# Patient Record
Sex: MALE | Race: WHITE | Employment: OTHER | URBAN - METROPOLITAN AREA
[De-identification: names, ages, dates, MRNs, and addresses within clinical notes are randomized per-mention and may not be internally consistent; named-entity substitution may affect disease eponyms.]

---

## 2017-01-02 ENCOUNTER — TELEPHONE (OUTPATIENT)
Dept: FAMILY MEDICINE | Facility: CLINIC | Age: 82
End: 2017-01-02

## 2017-01-02 DIAGNOSIS — J31.0 CHRONIC RHINITIS: Primary | ICD-10-CM

## 2017-01-02 RX ORDER — IPRATROPIUM BROMIDE 21 UG/1
2 SPRAY, METERED NASAL EVERY 12 HOURS
Qty: 1 BOX | Refills: 5 | Status: SHIPPED | OUTPATIENT
Start: 2017-01-02 | End: 2019-01-18

## 2017-01-02 NOTE — TELEPHONE ENCOUNTER
Atrovent       Last Written Prescription Date: 11/5/2015  Last Fill Quantity: 1 box, # refills: 5    Last Office Visit with G, P or [facility] prescribing provider:  8/8/2016   Future Office Visit:       Date of Last Asthma Action Plan Letter:   There are no preventive care reminders to display for this patient.   Asthma Control Test: No flowsheet data found.    Date of Last Spirometry Test:   No results found for this or any previous visit.

## 2017-01-02 NOTE — TELEPHONE ENCOUNTER
Prescription approved per [facility] Refill Protocol.  Number listed in message is not the number for patient.  Called the number listed in contacts, informed of Rx sent.  Patient will  Rx.    Becca Greene RN  [facility]

## 2017-03-04 ENCOUNTER — TELEPHONE (OUTPATIENT)
Dept: NURSING | Facility: CLINIC | Age: 82
End: 2017-03-04

## 2017-03-04 NOTE — TELEPHONE ENCOUNTER
Call Type: Triage Call    Presenting Problem: Cannot urinate, sometimes I cannot. what do I do  if walking around doesn't help it on Saturday.  Go to UC or Er  depending upon symptoms and how long it has been.  Triage Note:  Guideline Title: Urinary Symptoms - Male  Recommended Disposition: See Provider within 4 hours  Original Inclination: Wanted to speak with a nurse  Override Disposition:  Intended Action: Follow advice given  Physician Contacted: No  No urination for 12 or more hours ?  YES  Blood in urine ? NO  Flank pain ? NO  New or worsening signs and symptoms that may indicate shock ? NO  Sudden onset of pain in testicle(s), groin, or lower abdomen AND testicle(s)  swollen or tender to touch ? NO  Unbearable abdominal/pelvic pain ? NO  Current or recent urinary tract instrumentation AND urinary tract symptoms OR no  urine flow ? NO  Urinary tract symptoms AND fever 101.5 F (38.6 C) or higher or vomiting ? NO  Any temperature elevation in an immunocompromised individual OR frail elderly with  signs of dehydration ? NO  Any other unexpected urinary symptoms following urinary tract or abdominal surgery  within timeframe specified by provider ? NO  Physician Instructions:  Care Advice: SYMPTOM / CONDITION MANAGEMENT

## 2017-04-25 NOTE — PROGRESS NOTES
[address] [address]  [phone]  Dept: [phone]    PRE-OP EVALUATION:  Today's date: 2017    Vinnie Duran (: 10/12/1933) presents for pre-operative evaluation assessment as requested by Dr. Garcia.  He requires evaluation and anesthesia risk assessment prior to undergoing surgery/procedure for treatment of BPH.  Proposed procedure: TURP    Date of Surgery/ Procedure: 17  Time of Surgery/ Procedure: 5:30 AM  Hospital/Surgical Facility: [facility]  Fax number for surgical facility: [phone]  Primary Physician: Navdeep Pabon  Type of Anesthesia Anticipated: to be determined    Patient has a Health Care Directive or Living Will:  YES Will bring in a copy    1. NO - Do you have a history of heart attack, stroke, stent, bypass or surgery on an artery in the head, neck, heart or legs?  2. NO - Do you ever have any pain or discomfort in your chest?  3. NO - Do you have a history of  Heart Failure?  4. NO - Are you troubled by shortness of breath when: walking on the level, up a slight hill or at night?  5. NO - Do you currently have a cold, bronchitis or other respiratory infection?  6. NO - Do you have a cough, shortness of breath or wheezing?  7. NO - Do you sometimes get pains in the calves of your legs when you walk?  8. NO - Do you or anyone in your family have previous history of blood clots?  9. NO - Do you or does anyone in your family have a serious bleeding problem such as prolonged bleeding following surgeries or cuts?  10. NO - Have you ever had problems with anemia or been told to take iron pills?  11. NO - Have you had any abnormal blood loss such as black, tarry or bloody stools, or abnormal vaginal bleeding?  12. NO - Have you ever had a blood transfusion?  13. NO - Have you or any of your relatives ever had problems with anesthesia?  14. NO - Do you have sleep apnea, excessive snoring or daytime  drowsiness?  15. NO - Do you have any prosthetic heart valves?  16. NO - Do you have prosthetic joints?  17. NO - Is there any chance that you may be pregnant?      HPI:                                                      Brief HPI related to upcoming procedure: 83-year-old white male has had long-standing urinary obstructive symptoms. He had a TURP done in 2008 which helped for a number of years. In more recent months he's had ongoing urinary obstructive symptoms again. He was on tamsulosin. He developed urinary retention a few weeks ago and has been on a self catheter program the last couple of weeks. He is in now for another TURP.      See problem list for active medical problems.  Problems all longstanding and stable, except as noted/documented.  See ROS for pertinent symptoms related to these conditions.                                                                                                  .    MEDICAL HISTORY:                                                      Patient Active Problem List    Diagnosis Date Noted     Thrombocytopenia (H) 11/11/2015     Priority: Medium     Chronic rhinitis 11/05/2015     Priority: Medium     Benign non-nodular prostatic hyperplasia with lower urinary tract symptoms 11/05/2015     Priority: Medium     Advanced directives, counseling/discussion 03/12/2014     Priority: Medium     Advance Care Planning:   ACP Review and Resources Provided:  Reviewed chart for advance care plan.  Vinnie Duran has no plan or code status on file however states presence of ACP document. Copy requested. Confirmed code status reflects current choices pending receipt of document/advance care plan review. Confirmed/documented designated decision maker(s). See permanent comments section of demographics in clinical tab.   Added by Odilia Mckeon on 3/12/2014             Hyperlipidemia with target LDL less than 130      Priority: Medium     Diagnosis updated by automated process.  Provider to review and confirm.       Erectile dysfunction 07/26/2013     Priority: Medium      Past Medical History:   Diagnosis Date     BPH (benign prostatic hypertrophy)      Erectile dysfunction      GERD (gastroesophageal reflux disease)      Hyperlipidemia LDL goal < 130      Melanoma (H) 2/03    superficial spreading     Past Surgical History:   Procedure Laterality Date     APPENDECTOMY       ARTHROSCOPY KNEE      right     CATARACT IOL, RT/LT  2/11    right     CATARACT IOL, RT/LT  2/13    left     HERNIA REPAIR  1989    bilat inguinal     TONSILLECTOMY       TURP  2008     Current Outpatient Prescriptions   Medication Sig Dispense Refill     amoxicillin (AMOXIL) 500 MG tablet Take 500 mg by mouth 3 times daily       ipratropium (ATROVENT) 0.03 % spray Spray 2 sprays into both nostrils every 12 hours 1 Box 5     FISH OIL        OTC products: None, except as noted above    No Known Allergies   Latex Allergy: NO    Social History   Substance Use Topics     Smoking status: Never Smoker     Smokeless tobacco: Never Used     Alcohol use No     History   Drug Use No       REVIEW OF SYSTEMS:                                                    C: NEGATIVE for fever, chills, change in weight  I: NEGATIVE for worrisome rashes, moles or lesions  E: NEGATIVE for vision changes or irritation  E/M: NEGATIVE for ear, mouth and throat problems  R: NEGATIVE for significant cough or SOB  B: NEGATIVE for masses, tenderness or discharge  CV: NEGATIVE for chest pain, palpitations or peripheral edema  GI: NEGATIVE for nausea, abdominal pain, heartburn, or change in bowel habits   male : see above  M: NEGATIVE for significant arthralgias or myalgia  N: NEGATIVE for weakness, dizziness or paresthesias  E: NEGATIVE for temperature intolerance, skin/hair changes  H: NEGATIVE for bleeding problems  P: NEGATIVE for changes in mood or affect    EXAM:                                                    /73 (BP Location: Right  "arm, Patient Position: Chair, Cuff Size: Adult Regular)  Pulse 75  Temp 97.4  F (36.3  C) (Oral)  Ht 5' 7.5\" (1.715 m)  Wt 183 lb (83 kg)  SpO2 94%  BMI 28.24 kg/m2    GENERAL APPEARANCE: healthy, alert and no distress     EYES: EOMI,  PERRL     HENT: Grossly normal     NECK: no adenopathy, no asymmetry, masses, or scars and thyroid normal to palpation     RESP: lungs clear to auscultation - no rales, rhonchi or wheezes     CV: regular rates and rhythm with some mild ectopy, normal S1 S2, no S3 or S4 and no murmur, click or rub     ABDOMEN:  soft, nontender, no HSM or masses      MS: extremities normal- no gross deformities noted     SKIN: no suspicious lesions or rashes     NEURO: Normal strength and tone, sensory exam grossly normal, mentation intact and speech normal     PSYCH: mentation appears normal. and affect normal/bright    DIAGNOSTICS:                                                    EKG: Normal Sinus Rhythm, occasional PVC, normal axis, normal intervals, no acute ST/T changes c/w ischemia, no LVH by voltage criteria  LABS:   Office Visit on 05/01/2017   Component Date Value Ref Range Status     WBC 05/01/2017 10.0  4.0 - 11.0 10e9/L Final    Verified by smear review     RBC Count 05/01/2017 4.42  4.4 - 5.9 10e12/L Final     Hemoglobin 05/01/2017 14.5  13.3 - 17.7 g/dL Final     Hematocrit 05/01/2017 42.7  40.0 - 53.0 % Final     MCV 05/01/2017 97  78 - 100 fl Final     MCH 05/01/2017 32.8  26.5 - 33.0 pg Final     MCHC 05/01/2017 34.0  31.5 - 36.5 g/dL Final     RDW 05/01/2017 13.4  10.0 - 15.0 % Final     Platelet Count 05/01/2017 142* 150 - 450 10e9/L Final     ]      Recent Labs   Lab Test  11/10/15   1014 01/02/13   HGB  14.7  16.3   PLT  126*  138*   NA  145*  142   POTASSIUM  4.4  4.7   CR  1.08  1.1        IMPRESSION:                                                    Reason for surgery/procedure: Urinary obstructive symptoms secondary to BPH  Diagnosis/reason for consult: Preoperative " history and physical     The proposed surgical procedure is considered INTERMEDIATE risk.    REVISED CARDIAC RISK INDEX  The patient has the following serious cardiovascular risks for perioperative complications such as (MI, PE, VFib and 3  AV Block):  No serious cardiac risks  INTERPRETATION: 0 risks: Class I (very low risk - 0.4% complication rate)    The patient has the following additional risks for perioperative complications:  No identified additional risks      ICD-10-CM    1. Preop general physical exam Z01.818 amoxicillin (AMOXIL) 500 MG tablet     EKG 12-lead, tracing only     CBC with platelets   2. Benign non-nodular prostatic hyperplasia with lower urinary tract symptoms N40.1    3. Thrombocytopenia (H) D69.6      Platelets are almost up into the normal range -- should not significantly affect bleeding risk.    RECOMMENDATIONS:                                                      APPROVAL GIVEN to proceed with proposed procedure, without further diagnostic evaluation       Signed Electronically by: Navdeep Pabon MD    Copy of this evaluation report is provided to requesting physician.    [place] Preop Guidelines

## 2017-05-01 ENCOUNTER — OFFICE VISIT (OUTPATIENT)
Dept: FAMILY MEDICINE | Facility: CLINIC | Age: 82
End: 2017-05-01
Payer: COMMERCIAL

## 2017-05-01 VITALS
TEMPERATURE: 97.4 F | HEART RATE: 75 BPM | OXYGEN SATURATION: 94 % | HEIGHT: 68 IN | BODY MASS INDEX: 27.74 KG/M2 | WEIGHT: 183 LBS | SYSTOLIC BLOOD PRESSURE: 131 MMHG | DIASTOLIC BLOOD PRESSURE: 73 MMHG

## 2017-05-01 DIAGNOSIS — Z01.818 PREOP GENERAL PHYSICAL EXAM: Primary | ICD-10-CM

## 2017-05-01 DIAGNOSIS — D69.6 THROMBOCYTOPENIA (H): ICD-10-CM

## 2017-05-01 DIAGNOSIS — N40.1 BENIGN NON-NODULAR PROSTATIC HYPERPLASIA WITH LOWER URINARY TRACT SYMPTOMS: ICD-10-CM

## 2017-05-01 LAB
ERYTHROCYTE [DISTWIDTH] IN BLOOD BY AUTOMATED COUNT: 13.4 % (ref 10–15)
HCT VFR BLD AUTO: 42.7 % (ref 40–53)
HGB BLD-MCNC: 14.5 G/DL (ref 13.3–17.7)
MCH RBC QN AUTO: 32.8 PG (ref 26.5–33)
MCHC RBC AUTO-ENTMCNC: 34 G/DL (ref 31.5–36.5)
MCV RBC AUTO: 97 FL (ref 78–100)
PLATELET # BLD AUTO: 142 10E9/L (ref 150–450)
RBC # BLD AUTO: 4.42 10E12/L (ref 4.4–5.9)
WBC # BLD AUTO: 10 10E9/L (ref 4–11)

## 2017-05-01 PROCEDURE — 93010 ELECTROCARDIOGRAM REPORT: CPT | Performed by: INTERNAL MEDICINE

## 2017-05-01 PROCEDURE — 99215 OFFICE O/P EST HI 40 MIN: CPT | Performed by: FAMILY MEDICINE

## 2017-05-01 PROCEDURE — 36415 COLL VENOUS BLD VENIPUNCTURE: CPT | Performed by: FAMILY MEDICINE

## 2017-05-01 PROCEDURE — 85027 COMPLETE CBC AUTOMATED: CPT | Performed by: FAMILY MEDICINE

## 2017-05-01 PROCEDURE — 93005 ELECTROCARDIOGRAM TRACING: CPT | Performed by: FAMILY MEDICINE

## 2017-05-01 RX ORDER — AMOXICILLIN 500 MG/1
500 TABLET, FILM COATED ORAL 3 TIMES DAILY
COMMUNITY
End: 2017-08-21

## 2017-05-01 NOTE — NURSING NOTE
"Chief Complaint   Patient presents with     Pre-Op Exam     DOS 5/18/17, Prostate, [facility]       Initial /73 (BP Location: Right arm, Patient Position: Chair, Cuff Size: Adult Regular)  Pulse 75  Temp 97.4  F (36.3  C) (Oral)  Ht 5' 7.5\" (1.715 m)  Wt 183 lb (83 kg)  SpO2 94%  BMI 28.24 kg/m2 Estimated body mass index is 28.24 kg/(m^2) as calculated from the following:    Height as of this encounter: 5' 7.5\" (1.715 m).    Weight as of this encounter: 183 lb (83 kg).  Medication Reconciliation: complete   Yaneli Garsia CMA       "

## 2017-05-01 NOTE — MR AVS SNAPSHOT
After Visit Summary   5/1/2017    Vinnie Duran    MRN: [id number]           Patient Information     Date Of Birth          10/12/1933        Visit Information        Provider Department      5/1/2017 3:00 PM Navdeep Pabon MD [facility]        Today's Diagnoses     Preop general physical exam    -  1    Benign non-nodular prostatic hyperplasia with lower urinary tract symptoms          Care Instructions      Before Your Surgery      Call your surgeon if there is any change in your health. This includes signs of a cold or flu (such as a sore throat, runny nose, cough, rash or fever).    Do not smoke, drink alcohol or take over the counter medicine (unless your surgeon or primary care doctor tells you to) for the 24 hours before and after surgery.    If you take prescribed drugs: Follow your doctor s orders about which medicines to take and which to stop until after surgery.    Eating and drinking prior to surgery: follow the instructions from your surgeon    Take a shower or bath the night before surgery. Use the soap your surgeon gave you to gently clean your skin. If you do not have soap from your surgeon, use your regular soap. Do not shave or scrub the surgery site.  Wear clean pajamas and have clean sheets on your bed.         Follow-ups after your visit        Who to contact     If you have questions or need follow up information about today's clinic visit or your schedule please contact [facility] directly at [phone].  Normal or non-critical lab and imaging results will be communicated to you by MyChart, letter or phone within 4 business days after the clinic has received the results. If you do not hear from us within 7 days, please contact the clinic through MyChart or phone. If you have a critical or abnormal lab result, we will notify you by phone as soon as possible.  Submit refill requests through [employer] or call your pharmacy and they  "will forward the refill request to us. Please allow 3 business days for your refill to be completed.          Additional Information About Your Visit        [employer] Information     [employer] lets you send messages to your doctor, view your test results, renew your prescriptions, schedule appointments and more. To sign up, go to [email or web address] . Click on \"Log in\" on the left side of the screen, which will take you to the Welcome page. Then click on \"Sign up Now\" on the right side of the page.     You will be asked to enter the access code listed below, as well as some personal information. Please follow the directions to create your username and password.     Your access code is: [email or web address]  Expires: 2017  8:36 AM     Your access code will  in 90 days. If you need help or a new code, please call your [place] clinic or [phone].        Care EveryWhere ID     This is your [facility] EveryWhere ID. This could be used by other organizations to access your [place] medical records  [id number]        Your Vitals Were     Pulse Temperature Height Pulse Oximetry BMI (Body Mass Index)       75 97.4  F (36.3  C) (Oral) 5' 7.5\" (1.715 m) 94% 28.24 kg/m2        Blood Pressure from Last 3 Encounters:   17 131/73   16 117/73   16 125/74    Weight from Last 3 Encounters:   17 183 lb (83 kg)   16 191 lb (86.6 kg)   16 200 lb (90.7 kg)              We Performed the Following     CBC with platelets     EKG 12-lead, tracing only        Primary Care Provider Office Phone # Fax #    Navdeep Pabon [phone]       [facility] [address]  [facility] [address]        Thank you!     Thank you for choosing [facility]  for your care. Our goal is always to provide you with excellent care. Hearing back from our patients is one way we can continue to improve our services. Please take a few minutes to complete the written " survey that you may receive in the mail after your visit with us. Thank you!             Your Updated Medication List - Protect others around you: Learn how to safely use, store and throw away your medicines at www.disposemymeds.org.          This list is accurate as of: 5/1/17  4:07 PM.  Always use your most recent med list.                   Brand Name Dispense Instructions for use    amoxicillin 500 MG tablet    AMOXIL     Take 500 mg by mouth 3 times daily       FISH OIL          ipratropium 0.03 % spray    ATROVENT    1 Box    Spray 2 sprays into both nostrils every 12 hours

## 2017-08-17 NOTE — PROGRESS NOTES
[address] [address]  [phone]  Dept: [phone]    PRE-OP EVALUATION:  Today's date: 2017    Vinnie Duran (: 10/12/1933) presents for pre-operative evaluation assessment as requested by Dr. Garcia.  He requires evaluation and anesthesia risk assessment prior to undergoing surgery/procedure for treatment of an 8 mm left-sided kidney stone .  Proposed procedure: Stone removal    Date of Surgery/ Procedure: 17  Time of Surgery/ Procedure: 9:45 AM  Hospital/Surgical Facility: [facility]  Fax number for surgical facility: [phone]  Primary Physician: Navdeep Pabon  Type of Anesthesia Anticipated: to be determined    Patient has a Health Care Directive or Living Will:  YES will bring in a copy    1. NO - Do you have a history of heart attack, stroke, stent, bypass or surgery on an artery in the head, neck, heart or legs?  2. NO - Do you ever have any pain or discomfort in your chest?  3. NO - Do you have a history of  Heart Failure?  4. NO - Are you troubled by shortness of breath when: walking on the level, up a slight hill or at night?  5. NO - Do you currently have a cold, bronchitis or other respiratory infection?  6. NO - Do you have a cough, shortness of breath or wheezing?  7. NO - Do you sometimes get pains in the calves of your legs when you walk?  8. NO - Do you or anyone in your family have previous history of blood clots?  9. NO - Do you or does anyone in your family have a serious bleeding problem such as prolonged bleeding following surgeries or cuts?  10. NO - Have you ever had problems with anemia or been told to take iron pills?  11. NO - Have you had any abnormal blood loss such as black, tarry or bloody stools, or abnormal vaginal bleeding?  12. NO - Have you ever had a blood transfusion?  13. NO - Have you or any of your relatives ever had problems with anesthesia?  14. NO - Do you have  sleep apnea, excessive snoring or daytime drowsiness?  15. NO - Do you have any prosthetic heart valves?  16. NO - Do you have prosthetic joints?  17. NO - Is there any chance that you may be pregnant?        HPI:                                                      Brief HPI related to upcoming procedure: 83-year-old white male who had a TURP in May of this year. Last week on Wednesday, August 16 he developed left-sided flank and abdominal pain with persistent vomiting. He went to the ER where he was found to have mild left-sided hydronephrosis with a left renal pelvic stone measuring 8 mm. There was also an additional 1 mm left intrarenal stone noted. He was also found to have a urinary tract infection. He was started on a 10 day course of Cipro, which he is still taking.  He is coming in now for procedural intervention for the left 8 mm kidney stone.      See problem list for active medical problems.  Problems all longstanding and stable, except as noted/documented.  See ROS for pertinent symptoms related to these conditions.                                                                                                  .    MEDICAL HISTORY:                                                    Patient Active Problem List    Diagnosis Date Noted     Thrombocytopenia (H) 11/11/2015     Priority: Medium     Chronic rhinitis 11/05/2015     Priority: Medium     Benign non-nodular prostatic hyperplasia with lower urinary tract symptoms 11/05/2015     Priority: Medium     Advanced directives, counseling/discussion 03/12/2014     Priority: Medium     Advance Care Planning:   ACP Review and Resources Provided:  Reviewed chart for advance care plan.  [place] JACK Duran has no plan or code status on file however states presence of ACP document. Copy requested. Confirmed code status reflects current choices pending receipt of document/advance care plan review. Confirmed/documented designated decision maker(s). See permanent  comments section of demographics in clinical tab.   Added by Odilia Mckeon on 3/12/2014             Hyperlipidemia with target LDL less than 130      Priority: Medium     Diagnosis updated by automated process. Provider to review and confirm.       Erectile dysfunction 07/26/2013     Priority: Medium      Past Medical History:   Diagnosis Date     BPH (benign prostatic hypertrophy)      Erectile dysfunction      GERD (gastroesophageal reflux disease)      Hyperlipidemia LDL goal < 130      Melanoma (H) 2/03    superficial spreading     Past Surgical History:   Procedure Laterality Date     APPENDECTOMY       ARTHROSCOPY KNEE      right     CATARACT IOL, RT/LT  2/11    right     CATARACT IOL, RT/LT  2/13    left     HERNIA REPAIR  1989    bilat inguinal     TONSILLECTOMY       TURP  2008 and 5/18/17     Current Outpatient Prescriptions   Medication Sig Dispense Refill     ciprofloxacin (CIPRO) 500 MG tablet Take 500 mg by mouth       HYDROcodone-acetaminophen (NORCO) 5-325 MG per tablet Take 1-2 tablets by mouth       ondansetron (ZOFRAN-ODT) 4 MG ODT tab Place 4 mg under the tongue       ipratropium (ATROVENT) 0.03 % spray Spray 2 sprays into both nostrils every 12 hours 1 Box 5     FISH OIL        OTC products: None, except as noted above    No Known Allergies   Latex Allergy: NO    Social History   Substance Use Topics     Smoking status: Never Smoker     Smokeless tobacco: Never Used     Alcohol use No     History   Drug Use No       REVIEW OF SYSTEMS:                                                    C: NEGATIVE for fever, chills, change in weight  I: NEGATIVE for worrisome rashes, moles or lesions  E: NEGATIVE for vision changes or irritation  E/M: NEGATIVE for ear, mouth and throat problems  R: NEGATIVE for significant cough or SOB  B: NEGATIVE for masses, tenderness or discharge  CV: NEGATIVE for chest pain, palpitations or peripheral edema  GI: NEGATIVE for nausea, abdominal pain, heartburn, or change  in bowel habits   male : He has still had up to 300 cc of residual urine after his TURP, so he is still on a self catheter program  M: NEGATIVE for significant arthralgias or myalgia  N: NEGATIVE for weakness, dizziness or paresthesias  E: NEGATIVE for temperature intolerance, skin/hair changes  H: NEGATIVE for bleeding problems  P: NEGATIVE for changes in mood or affect    EXAM:                                                    /73 (BP Location: Right arm, Patient Position: Chair, Cuff Size: Adult Regular)  Pulse 70  Temp 98.6  F (37  C) (Oral)  Wt 188 lb (85.3 kg)  SpO2 94%  BMI 29.01 kg/m2    GENERAL APPEARANCE: healthy, alert and no distress     EYES: EOMI,  PERRL     HENT: ear canals and TM's normal and nose and mouth without ulcers or lesions     NECK: no adenopathy, no asymmetry, masses, or scars and thyroid normal to palpation     RESP: lungs clear to auscultation - no rales, rhonchi or wheezes     CV: regular rates and rhythm, normal S1 S2, no S3 or S4 and no murmur, click or rub     ABDOMEN:  soft, nontender, no HSM or masses      MS: extremities normal- no gross deformities noted, no evidence of inflammation in joints     SKIN: no suspicious lesions or rashes     NEURO: Normal strength and tone, sensory exam grossly normal, mentation intact and speech normal     PSYCH: mentation appears normal and affect normal/bright    DIAGNOSTICS:                                                    EKG: from 5/1/17 -- Normal Sinus Rhythm, normal axis, normal intervals, no acute ST/T changes c/w ischemia, no LVH by voltage criteria, occasional PAC  LABS:   Office Visit on 08/21/2017   Component Date Value Ref Range Status     Color Urine 08/21/2017 Yellow   Final     Appearance Urine 08/21/2017 Clear   Final     Glucose Urine 08/21/2017 Negative  NEG^Negative mg/dL Final     Bilirubin Urine 08/21/2017 Negative  NEG^Negative Final     Ketones Urine 08/21/2017 Negative  NEG^Negative mg/dL Final     Specific  Gravity Urine 08/21/2017 <=1.005  1.003 - 1.035 Final     Blood Urine 08/21/2017 Small* NEG^Negative Final     pH Urine 08/21/2017 5.5  5.0 - 7.0 pH Final     Protein Albumin Urine 08/21/2017 Negative  NEG^Negative mg/dL Final     Urobilinogen Urine 08/21/2017 0.2  0.2 - 1.0 EU/dL Final     Nitrite Urine 08/21/2017 Negative  NEG^Negative Final     Leukocyte Esterase Urine 08/21/2017 Moderate* NEG^Negative Final     Source 08/21/2017 Midstream Urine   Final     WBC Urine 08/21/2017 * OTO2^O - 2 /HPF Final     RBC Urine 08/21/2017 10-25* OTO2^O - 2 /HPF Final     Squamous Epithelial /LPF Urine 08/21/2017 Few  FEW^Few /LPF Final     Bacteria Urine 08/21/2017 Moderate* NEG^Negative /HPF Final     ]    Recent Labs   Lab Test  05/01/17   1619  11/10/15   1014 01/02/13   HGB  14.5  14.7  16.3   PLT  142*  126*  138*   NA   --   145*  142   POTASSIUM   --   4.4  4.7   CR   --   1.08  1.1      His urine culture from August 16 from the ER grew out greater than 100,000 colonies of enterococcus, sensitive to levofloxacin (so presumably also to ciprofloxacin).    IMPRESSION:                                                    Reason for surgery/procedure: Left-sided nephrolithiasis  Diagnosis/reason for consult: Preoperative history and physical     The proposed surgical procedure is considered LOW risk.    REVISED CARDIAC RISK INDEX  The patient has the following serious cardiovascular risks for perioperative complications such as (MI, PE, VFib and 3  AV Block):  No serious cardiac risks  INTERPRETATION: 0 risks: Class I (very low risk - 0.4% complication rate)    The patient has the following additional risks for perioperative complications:  No identified additional risks      ICD-10-CM    1. Preop general physical exam Z01.818 Urine Microscopic   2. Calculus of kidney N20.0 HYDROcodone-acetaminophen (NORCO) 5-325 MG per tablet     UA reflex to Microscopic and Culture   3. Nonspecific finding on examination of urine  R82.90 Urine Culture Aerobic Bacterial       RECOMMENDATIONS:                                                      His urinalysis looks improved from last week and he was advised to finish out the Cipro.    --Patient is to take all scheduled medications on the day of surgery EXCEPT for modifications listed below.    APPROVAL GIVEN to proceed with proposed procedure, without further diagnostic evaluation       Signed Electronically by: Navdeep Pabon MD    Copy of this evaluation report is provided to requesting physician.    [place] Preop Guidelines

## 2017-08-21 ENCOUNTER — OFFICE VISIT (OUTPATIENT)
Dept: FAMILY MEDICINE | Facility: CLINIC | Age: 82
End: 2017-08-21
Payer: COMMERCIAL

## 2017-08-21 VITALS
TEMPERATURE: 98.6 F | DIASTOLIC BLOOD PRESSURE: 73 MMHG | SYSTOLIC BLOOD PRESSURE: 135 MMHG | OXYGEN SATURATION: 94 % | WEIGHT: 188 LBS | HEART RATE: 70 BPM | BODY MASS INDEX: 29.01 KG/M2

## 2017-08-21 DIAGNOSIS — Z01.818 PREOP GENERAL PHYSICAL EXAM: Primary | ICD-10-CM

## 2017-08-21 DIAGNOSIS — N20.0 CALCULUS OF KIDNEY: ICD-10-CM

## 2017-08-21 DIAGNOSIS — R82.90 NONSPECIFIC FINDING ON EXAMINATION OF URINE: ICD-10-CM

## 2017-08-21 LAB
ALBUMIN UR-MCNC: NEGATIVE MG/DL
APPEARANCE UR: CLEAR
BACTERIA #/AREA URNS HPF: ABNORMAL /HPF
BILIRUB UR QL STRIP: NEGATIVE
COLOR UR AUTO: YELLOW
GLUCOSE UR STRIP-MCNC: NEGATIVE MG/DL
HGB UR QL STRIP: ABNORMAL
KETONES UR STRIP-MCNC: NEGATIVE MG/DL
LEUKOCYTE ESTERASE UR QL STRIP: ABNORMAL
NITRATE UR QL: NEGATIVE
NON-SQ EPI CELLS #/AREA URNS LPF: ABNORMAL /LPF
PH UR STRIP: 5.5 PH (ref 5–7)
RBC #/AREA URNS AUTO: ABNORMAL /HPF
SOURCE: ABNORMAL
SP GR UR STRIP: <=1.005 (ref 1–1.03)
UROBILINOGEN UR STRIP-ACNC: 0.2 EU/DL (ref 0.2–1)
WBC #/AREA URNS AUTO: ABNORMAL /HPF

## 2017-08-21 PROCEDURE — 87086 URINE CULTURE/COLONY COUNT: CPT | Performed by: FAMILY MEDICINE

## 2017-08-21 PROCEDURE — 99215 OFFICE O/P EST HI 40 MIN: CPT | Performed by: FAMILY MEDICINE

## 2017-08-21 PROCEDURE — 81001 URINALYSIS AUTO W/SCOPE: CPT | Performed by: FAMILY MEDICINE

## 2017-08-21 RX ORDER — ONDANSETRON 4 MG/1
4 TABLET, ORALLY DISINTEGRATING ORAL
COMMUNITY
Start: 2017-08-16 | End: 2018-07-19

## 2017-08-21 RX ORDER — CIPROFLOXACIN 500 MG/1
500 TABLET, FILM COATED ORAL
COMMUNITY
Start: 2017-08-16 | End: 2017-08-26

## 2017-08-21 RX ORDER — HYDROCODONE BITARTRATE AND ACETAMINOPHEN 5; 325 MG/1; MG/1
1-2 TABLET ORAL
COMMUNITY
Start: 2017-08-16 | End: 2017-08-21

## 2017-08-21 RX ORDER — HYDROCODONE BITARTRATE AND ACETAMINOPHEN 5; 325 MG/1; MG/1
1-2 TABLET ORAL EVERY 6 HOURS PRN
Qty: 10 TABLET | Refills: 0 | Status: SHIPPED | OUTPATIENT
Start: 2017-08-21 | End: 2018-07-19

## 2017-08-21 NOTE — NURSING NOTE
"Chief Complaint   Patient presents with     Pre-Op Exam     DOS 8/23, kidney stones       Initial /73 (BP Location: Right arm, Patient Position: Chair, Cuff Size: Adult Regular)  Pulse 70  Temp 98.6  F (37  C) (Oral)  Wt 188 lb (85.3 kg)  SpO2 94%  BMI 29.01 kg/m2 Estimated body mass index is 29.01 kg/(m^2) as calculated from the following:    Height as of 5/1/17: 5' 7.5\" (1.715 m).    Weight as of this encounter: 188 lb (85.3 kg).  Medication Reconciliation: complete   Yaneli Garsia CMA       "

## 2017-08-21 NOTE — MR AVS SNAPSHOT
After Visit Summary   8/21/2017    Vinnie Duran    MRN: [id number]           Patient Information     Date Of Birth          10/12/1933        Visit Information        Provider Department      8/21/2017 8:40 AM Navdeep Pabon MD [facility]        Today's Diagnoses     Preop general physical exam    -  1    Calculus of kidney          Care Instructions      Before Your Surgery      Call your surgeon if there is any change in your health. This includes signs of a cold or flu (such as a sore throat, runny nose, cough, rash or fever).    Do not smoke, drink alcohol or take over the counter medicine (unless your surgeon or primary care doctor tells you to) for the 24 hours before and after surgery.    If you take prescribed drugs: Follow your doctor s orders about which medicines to take and which to stop until after surgery.    Eating and drinking prior to surgery: follow the instructions from your surgeon    Take a shower or bath the night before surgery. Use the soap your surgeon gave you to gently clean your skin. If you do not have soap from your surgeon, use your regular soap. Do not shave or scrub the surgery site.  Wear clean pajamas and have clean sheets on your bed.           Follow-ups after your visit        Follow-up notes from your care team     Return if symptoms worsen or fail to improve.      Who to contact     If you have questions or need follow up information about today's clinic visit or your schedule please contact [facility] directly at [phone].  Normal or non-critical lab and imaging results will be communicated to you by MyChart, letter or phone within 4 business days after the clinic has received the results. If you do not hear from us within 7 days, please contact the clinic through MyChart or phone. If you have a critical or abnormal lab result, we will notify you by phone as soon as possible.  Submit refill requests through  "Juliet or call your pharmacy and they will forward the refill request to us. Please allow 3 business days for your refill to be completed.          Additional Information About Your Visit        MyChart Information     [employer] Maxhart lets you send messages to your doctor, view your test results, renew your prescriptions, schedule appointments and more. To sign up, go to [email or web address] . Click on \"Log in\" on the left side of the screen, which will take you to the Welcome page. Then click on \"Sign up Now\" on the right side of the page.     You will be asked to enter the access code listed below, as well as some personal information. Please follow the directions to create your username and password.     Your access code is: [email or web address]  Expires: 2017  9:25 AM     Your access code will  in 90 days. If you need help or a new code, please call your [place] clinic or [phone].        Care EveryWhere ID     This is your Care EveryWhere ID. This could be used by other organizations to access your [place] medical records  [id number]        Your Vitals Were     Pulse Temperature Pulse Oximetry BMI (Body Mass Index)          70 98.6  F (37  C) (Oral) 94% 29.01 kg/m2         Blood Pressure from Last 3 Encounters:   17 135/73   17 131/73   16 117/73    Weight from Last 3 Encounters:   17 188 lb (85.3 kg)   17 183 lb (83 kg)   16 191 lb (86.6 kg)              We Performed the Following     UA reflex to Microscopic and Culture          Today's Medication Changes          These changes are accurate as of: 17  9:25 AM.  If you have any questions, ask your nurse or doctor.               These medicines have changed or have updated prescriptions.        Dose/Directions    HYDROcodone-acetaminophen 5-325 MG per tablet   Commonly known as:  NORCO   This may have changed:    - when to take this  - reasons to take this   Used for:  Calculus of kidney   Changed by:  Cm" Navdeep VARGHESE MD        Dose:  1-2 tablet   Take 1-2 tablets by mouth every 6 hours as needed for moderate to severe pain   Quantity:  10 tablet   Refills:  0            Where to get your medicines      Some of these will need a paper prescription and others can be bought over the counter.  Ask your nurse if you have questions.     Bring a paper prescription for each of these medications     HYDROcodone-acetaminophen 5-325 MG per tablet                Primary Care Provider Office Phone # Fax #    Navdeep Pabon [phone]       [address] [facility] [address]        Equal Access to Services     [facility]: Hadii aad ku hadasho Soomaali, waaxda luqadaha, qaybta kaalmada adeegyada, chel aiken . So [facility] [phone].    ATENCIÓN: Si habla español, tiene a ferro disposición servicios gratuitos de asistencia lingüística. Llame al [phone].    We comply with applicable federal civil rights laws and [place] laws. We do not discriminate on the basis of race, color, national origin, age, disability sex, sexual orientation or gender identity.            Thank you!     Thank you for choosing [facility]  for your care. Our goal is always to provide you with excellent care. Hearing back from our patients is one way we can continue to improve our services. Please take a few minutes to complete the written survey that you may receive in the mail after your visit with us. Thank you!             Your Updated Medication List - Protect others around you: Learn how to safely use, store and throw away your medicines at www.disposemymeds.org.          This list is accurate as of: 8/21/17  9:25 AM.  Always use your most recent med list.                   Brand Name Dispense Instructions for use Diagnosis    ciprofloxacin 500 MG tablet    CIPRO     Take 500 mg by mouth        FISH OIL           HYDROcodone-acetaminophen 5-325 MG per tablet    NORCO     10 tablet    Take 1-2 tablets by mouth every 6 hours as needed for moderate to severe pain    Calculus of kidney       ipratropium 0.03 % spray    ATROVENT    1 Box    Spray 2 sprays into both nostrils every 12 hours    Chronic rhinitis       ondansetron 4 MG ODT tab    ZOFRAN-ODT     Place 4 mg under the tongue

## 2017-08-22 ENCOUNTER — TELEPHONE (OUTPATIENT)
Dept: FAMILY MEDICINE | Facility: CLINIC | Age: 82
End: 2017-08-22

## 2017-08-22 LAB
BACTERIA SPEC CULT: NO GROWTH
SPECIMEN SOURCE: NORMAL

## 2017-08-22 NOTE — TELEPHONE ENCOUNTER
Reason for Call:  Other Pre Op notes    Detailed comments:  Patient is scheduled for surgery tomorrow 8/23/17 at 5:30am and Alda needs the Pre Op questionnaire and EKG if that was done to [phone].      Phone Number Patient can be reached at: Other phone number:  [phone]     Best Time: anytime    Can we leave a detailed message on this number? YES    Call taken on 8/22/2017 at 1:06 PM by Zehra Nixon

## 2017-08-23 ENCOUNTER — TRANSFERRED RECORDS (OUTPATIENT)
Dept: HEALTH INFORMATION MANAGEMENT | Facility: CLINIC | Age: 82
End: 2017-08-23

## 2017-10-09 ENCOUNTER — ALLIED HEALTH/NURSE VISIT (OUTPATIENT)
Dept: NURSING | Facility: CLINIC | Age: 82
End: 2017-10-09
Payer: COMMERCIAL

## 2017-10-09 DIAGNOSIS — Z23 NEED FOR PROPHYLACTIC VACCINATION AND INOCULATION AGAINST INFLUENZA: Primary | ICD-10-CM

## 2017-10-09 PROCEDURE — 90662 IIV NO PRSV INCREASED AG IM: CPT

## 2017-10-09 PROCEDURE — G0008 ADMIN INFLUENZA VIRUS VAC: HCPCS

## 2017-10-09 PROCEDURE — 99207 ZZC NO CHARGE NURSE ONLY: CPT

## 2017-10-09 NOTE — MR AVS SNAPSHOT
"              After Visit Summary   10/9/2017    Vinnie Duran    MRN: [id number]           Patient Information     Date Of Birth          10/12/1933        Visit Information        Provider Department      10/9/2017 10:50 AM CARE COORDINATOR [facility]        Today's Diagnoses     Need for prophylactic vaccination and inoculation against influenza    -  1       Follow-ups after your visit        Who to contact     If you have questions or need follow up information about today's clinic visit or your schedule please contact [facility] directly at [phone].  Normal or non-critical lab and imaging results will be communicated to you by EnzySurgehart, letter or phone within 4 business days after the clinic has received the results. If you do not hear from us within 7 days, please contact the clinic through EnzySurgehart or phone. If you have a critical or abnormal lab result, we will notify you by phone as soon as possible.  Submit refill requests through Pitchbrite or call your pharmacy and they will forward the refill request to us. Please allow 3 business days for your refill to be completed.          Additional Information About Your Visit        MyChart Information     Pitchbrite lets you send messages to your doctor, view your test results, renew your prescriptions, schedule appointments and more. To sign up, go to [email or web address] . Click on \"Log in\" on the left side of the screen, which will take you to the Welcome page. Then click on \"Sign up Now\" on the right side of the page.     You will be asked to enter the access code listed below, as well as some personal information. Please follow the directions to create your username and password.     Your access code is: [email or web address]  Expires: 2017  9:25 AM     Your access code will  in 90 days. If you need help or a new code, please call your [facility] or [phone].        Care EveryWhere ID     This " is your Care EveryWhere ID. This could be used by other organizations to access your [place] medical records  [id number]         Blood Pressure from Last 3 Encounters:   08/21/17 135/73   05/01/17 131/73   08/08/16 117/73    Weight from Last 3 Encounters:   08/21/17 188 lb (85.3 kg)   05/01/17 183 lb (83 kg)   08/08/16 191 lb (86.6 kg)              We Performed the Following     ADMIN INFLUENZA (For MEDICARE Patients ONLY) []     FLU VACCINE, INCREASED ANTIGEN, PRESV FREE, AGE 65+ [[address]]        Primary Care Provider Office Phone # Fax #    Navdeep Pabon [phone]       [address] [facility] [address]        Equal Access to Services     [facility] : Hadii ilda nieto hadasho Soomaali, waaxda luqadaha, qaybta kaalmada adeegyada, chel aiken . So [facility] [phone].    ATENCIÓN: Si habla español, tiene a ferro disposición servicios gratuitos de asistencia lingüística. Llame al [phone].    We comply with applicable federal civil rights laws and [place] laws. We do not discriminate on the basis of race, color, national origin, age, disability, sex, sexual orientation, or gender identity.            Thank you!     Thank you for choosing [facility]  for your care. Our goal is always to provide you with excellent care. Hearing back from our patients is one way we can continue to improve our services. Please take a few minutes to complete the written survey that you may receive in the mail after your visit with us. Thank you!             Your Updated Medication List - Protect others around you: Learn how to safely use, store and throw away your medicines at www.disposemymeds.org.          This list is accurate as of: 10/9/17 11:16 AM.  Always use your most recent med list.                   Brand Name Dispense Instructions for use Diagnosis    FISH OIL           HYDROcodone-acetaminophen 5-325 MG per tablet    NORCO    10 tablet     Take 1-2 tablets by mouth every 6 hours as needed for moderate to severe pain    Calculus of kidney       ipratropium 0.03 % spray    ATROVENT    1 Box    Spray 2 sprays into both nostrils every 12 hours    Chronic rhinitis       ondansetron 4 MG ODT tab    ZOFRAN-ODT     Place 4 mg under the tongue

## 2017-10-09 NOTE — PROGRESS NOTES
Injectable Influenza Immunization Documentation    1.  Is the person to be vaccinated sick today?   No    2. Does the person to be vaccinated have an allergy to a component   of the vaccine?   No    3. Has the person to be vaccinated ever had a serious reaction   to influenza vaccine in the past?   No    4. Has the person to be vaccinated ever had Guillain-Barré syndrome?   No    Form completed by Yaneli Garsia CMA

## 2018-05-16 ENCOUNTER — OFFICE VISIT (OUTPATIENT)
Dept: FAMILY MEDICINE | Facility: CLINIC | Age: 83
End: 2018-05-16
Payer: COMMERCIAL

## 2018-05-16 VITALS
OXYGEN SATURATION: 96 % | HEART RATE: 76 BPM | TEMPERATURE: 96.7 F | WEIGHT: 191.6 LBS | SYSTOLIC BLOOD PRESSURE: 137 MMHG | DIASTOLIC BLOOD PRESSURE: 84 MMHG | BODY MASS INDEX: 29.57 KG/M2

## 2018-05-16 DIAGNOSIS — J98.01 ACUTE BRONCHOSPASM: Primary | ICD-10-CM

## 2018-05-16 DIAGNOSIS — N52.9 ED (ERECTILE DYSFUNCTION): Primary | ICD-10-CM

## 2018-05-16 PROCEDURE — 99213 OFFICE O/P EST LOW 20 MIN: CPT | Performed by: PHYSICIAN ASSISTANT

## 2018-05-16 PROCEDURE — 84270 ASSAY OF SEX HORMONE GLOBUL: CPT

## 2018-05-16 PROCEDURE — 84403 ASSAY OF TOTAL TESTOSTERONE: CPT

## 2018-05-16 PROCEDURE — 36415 COLL VENOUS BLD VENIPUNCTURE: CPT

## 2018-05-16 RX ORDER — ALBUTEROL SULFATE 90 UG/1
1-2 AEROSOL, METERED RESPIRATORY (INHALATION) EVERY 6 HOURS PRN
Qty: 1 INHALER | Refills: 0 | Status: SHIPPED | OUTPATIENT
Start: 2018-05-16 | End: 2019-08-20

## 2018-05-16 RX ORDER — CODEINE PHOSPHATE AND GUAIFENESIN 10; 100 MG/5ML; MG/5ML
1-2 SOLUTION ORAL EVERY 6 HOURS PRN
Qty: 180 ML | Refills: 0 | Status: SHIPPED | OUTPATIENT
Start: 2018-05-16 | End: 2018-07-19

## 2018-05-16 NOTE — PROGRESS NOTES
SUBJECTIVE:  Vinnie Duran is a 84 year old male who presents with the following concerns;              Symptoms: cc Present Absent Comment   Fever/Chills   x    Fatigue  x     Muscle Aches   x    Eye Irritation   x    Sneezing   x    Nasal David/Drg  x     Sinus Pressure/Pain   x    Loss of smell   x    Dental pain   x    Sore Throat   x    Swollen Glands   x    Ear Pain/Fullness   x    Cough  x  Deep coughing   Wheeze  x     Chest Pain   x    Shortness of breath   x    Rash   x    Other   x      Symptom duration:  x3wks   Sympom severity:  better   Treatments tried:  OTC tylenol and sinus medication   Contacts:  none     3 week history of cough, sore throat, sinus drainage, and fatigue. No temperature recorded.  Started out as a cold/possible seasonal allergies, but his cough has hung around with clear/yellow sputum longer than he expected.   Taking OTC cough suppression with relief.   Bowel and urinary patterns are normal for patient.   No pain today    Medications updated and reviewed.  Past, family and surgical history is updated and reviewed in the record.    ROS:  Other than noted above, general, HEENT, respiratory, cardiac and gastrointestinal systems are negative.    OBJECTIVE:  /84  Pulse 76  Temp 96.7  F (35.9  C) (Tympanic)  Wt 191 lb 9.6 oz (86.9 kg)  SpO2 96%  BMI 29.57 kg/m2  GENERAL: Pleasant and interactive. No acute distress.  HEENT: Mild injection of conjunctiva. TMs clear. Oropharynx moist and clear.   NECK: supple and free of adenopathy or masses, the thyroid is normal without enlargement or nodules.  CHEST:  clear, no wheezing or rales. Normal symmetric air entry throughout both lung fields. No chest wall deformities or tenderness.  HEART:  S1 and S2 normal, no murmurs, clicks, gallops or rubs. Regular rate and rhythm.  SKIN:  Only benign skin findings. No unusual rashes or suspicious skin lesions noted. Nails appear normal.    Assessment:    Encounter Diagnosis   Name Primary?      Acute bronchospasm Yes     Plan:   Orders Placed This Encounter     guaiFENesin-codeine (ROBITUSSIN AC) 100-10 MG/5ML SOLN solution     albuterol (PROAIR HFA/PROVENTIL HFA/VENTOLIN HFA) 108 (90 Base) MCG/ACT Inhaler         Supportive therapy also discussed. Follow up if symptoms fail to improve or worsen.      The patient was in agreement with the plan today and had no questions or concerns prior to leaving the clinic.     Physician Attestation   I, Noni Piper, was present with the medical student who participated in the service and in the documentation of the note.  I have verified the history and personally performed the physical exam and medical decision making.  I agree with the assessment and plan of care as documented in the note.      Items personally reviewed: vitals     Noni Piper PA-C

## 2018-05-16 NOTE — PATIENT INSTRUCTIONS
Use your inhaler 2-3 times per day or when you're wheezing or coughing.  Blow all of your air out, bring the inhaler to your mouth, press the button, and breath in. Hold your breath 6-8 seconds then slowing exhale.     Increase your water intake in order to keep the secretions/mucous in your upper respiratory tract thin. Get plenty of rest and wash your hands well. Follow up if symptoms fail to improve or worsen.

## 2018-05-16 NOTE — MR AVS SNAPSHOT
"              After Visit Summary   5/16/2018    Vinnie Duran    MRN: [id number]           Patient Information     Date Of Birth          10/12/1933        Visit Information        Provider Department      5/16/2018 10:20 AM Noni Piper PA-C [facility]        Today's Diagnoses     Acute bronchospasm    -  1      Care Instructions    Use your inhaler 2-3 times per day or when you're wheezing or coughing.  Blow all of your air out, bring the inhaler to your mouth, press the button, and breath in. Hold your breath 6-8 seconds then slowing exhale.     Increase your water intake in order to keep the secretions/mucous in your upper respiratory tract thin. Get plenty of rest and wash your hands well. Follow up if symptoms fail to improve or worsen.            Follow-ups after your visit        Who to contact     Normal or non-critical lab and imaging results will be communicated to you by RetSKUhart, letter or phone within 4 business days after the clinic has received the results. If you do not hear from us within 7 days, please contact the clinic through RetSKUhart or phone. If you have a critical or abnormal lab result, we will notify you by phone as soon as possible.  Submit refill requests through Ping Communication or call your pharmacy and they will forward the refill request to us. Please allow 3 business days for your refill to be completed.          If you need to speak with a  for additional information , please call: [phone]             Additional Information About Your Visit        Ping Communication Information     Ping Communication lets you send messages to your doctor, view your test results, renew your prescriptions, schedule appointments and more. To sign up, go to [email or web address] . Click on \"Log in\" on the left side of the screen, which will take you to the Welcome page. Then click on \"Sign up Now\" on the right side of the page.     You will be asked to enter the access code listed " below, as well as some personal information. Please follow the directions to create your username and password.     Your access code is: [email or web address]  Expires: 2018 10:54 AM     Your access code will  in 90 days. If you need help or a new code, please call your [facility] clinic or [phone].        Care EveryWhere ID     This is your Care EveryWhere ID. This could be used by other organizations to access your [facility] medical records  [id number]        Your Vitals Were     Pulse Temperature Pulse Oximetry BMI (Body Mass Index)          76 96.7  F (35.9  C) (Tympanic) 96% 29.57 kg/m2         Blood Pressure from Last 3 Encounters:   18 137/84   17 135/73   17 131/73    Weight from Last 3 Encounters:   18 191 lb 9.6 oz (86.9 kg)   17 188 lb (85.3 kg)   17 183 lb (83 kg)              Today, you had the following     No orders found for display         Today's Medication Changes          These changes are accurate as of 18 10:54 AM.  If you have any questions, ask your nurse or doctor.               Start taking these medicines.        Dose/Directions    albuterol 108 (90 Base) MCG/ACT Inhaler   Commonly known as:  PROAIR HFA/PROVENTIL HFA/VENTOLIN HFA   Used for:  Acute bronchospasm   Started by:  Noni Piper PA-C        Dose:  1-2 puff   Inhale 1-2 puffs into the lungs every 6 hours as needed for shortness of breath / dyspnea or wheezing   Quantity:  1 Inhaler   Refills:  0       guaiFENesin-codeine 100-10 MG/5ML Soln solution   Commonly known as:  ROBITUSSIN AC   Used for:  Acute bronchospasm   Started by:  Noni Piper PA-C        Dose:  1-2 tsp.   Take 5-10 mLs by mouth every 6 hours as needed for cough   Quantity:  180 mL   Refills:  0            Where to get your medicines      These medications were sent to [facility] [place] - [address]  [address], [place] [address]     Phone:  [phone]     albuterol  108 (90 Base) MCG/ACT Inhaler         Some of these will need a paper prescription and others can be bought over the counter.  Ask your nurse if you have questions.     Bring a paper prescription for each of these medications     guaiFENesin-codeine 100-10 MG/5ML Soln solution                Primary Care Provider Office Phone # Fax #    Navdeep Pabon [phone]       [address] [facility] [address]        Equal Access to Services     ROCHELLE INFANTE : Hadii aad ku hadasho Soomaali, waaxda luqadaha, qaybta kaalmada adeegyada, waxay idiin hayaan adeeg kharash la'aan . [facility] [phone].    ATENCIÓN: Si habla español, tiene a ferro disposición servicios gratuitos de asistencia lingüística. [facility] [phone].    We comply with applicable federal civil rights laws and [place] laws. We do not discriminate on the basis of race, color, national origin, age, disability, sex, sexual orientation, or gender identity.            Thank you!     Thank you for choosing [facility]  for your care. Our goal is always to provide you with excellent care. Hearing back from our patients is one way we can continue to improve our services. Please take a few minutes to complete the written survey that you may receive in the mail after your visit with us. Thank you!             Your Updated Medication List - Protect others around you: Learn how to safely use, store and throw away your medicines at www.disposemymeds.org.          This list is accurate as of 5/16/18 10:54 AM.  Always use your most recent med list.                   Brand Name Dispense Instructions for use Diagnosis    albuterol 108 (90 Base) MCG/ACT Inhaler    PROAIR HFA/PROVENTIL HFA/VENTOLIN HFA    1 Inhaler    Inhale 1-2 puffs into the lungs every 6 hours as needed for shortness of breath / dyspnea or wheezing    Acute bronchospasm       FISH OIL           guaiFENesin-codeine 100-10 MG/5ML Soln solution    ROBITUSSIN AC     180 mL    Take 5-10 mLs by mouth every 6 hours as needed for cough    Acute bronchospasm       HYDROcodone-acetaminophen 5-325 MG per tablet    NORCO    10 tablet    Take 1-2 tablets by mouth every 6 hours as needed for moderate to severe pain    Calculus of kidney       ipratropium 0.03 % spray    ATROVENT    1 Box    Spray 2 sprays into both nostrils every 12 hours    Chronic rhinitis       ondansetron 4 MG ODT tab    ZOFRAN-ODT     Place 4 mg under the tongue

## 2018-05-18 LAB
SHBG SERPL-SCNC: 45 NMOL/L (ref 11–80)
TESTOST FREE SERPL-MCNC: 7.69 NG/DL (ref 4.7–24.4)
TESTOST SERPL-MCNC: 454 NG/DL (ref 240–950)

## 2018-06-06 ENCOUNTER — TRANSFERRED RECORDS (OUTPATIENT)
Dept: HEALTH INFORMATION MANAGEMENT | Facility: CLINIC | Age: 83
End: 2018-06-06

## 2018-07-11 ENCOUNTER — TELEPHONE (OUTPATIENT)
Dept: FAMILY MEDICINE | Facility: CLINIC | Age: 83
End: 2018-07-11

## 2018-07-11 DIAGNOSIS — N52.01 ERECTILE DYSFUNCTION DUE TO ARTERIAL INSUFFICIENCY: Primary | ICD-10-CM

## 2018-07-11 NOTE — TELEPHONE ENCOUNTER
Patient sent me a letter in the mail regarding concerns about erectile dysfunction.  He wondered if extra testosterone might be helpful.  I reviewed his chart and see that his urologist checked his testosterone levels and they were normal.  I told him that I did not think extra testosterone would be helpful or appropriate.   The patient had tried Cialis years ago.  He wondered if he could try medication like that again.  I suggested that he could try medication like that.  We might be best off to prescribe generic sildenafil for him from a cost standpoint.  He will schedule a general physical with me in the near future and we will take this up further at that time.

## 2018-07-19 ENCOUNTER — OFFICE VISIT (OUTPATIENT)
Dept: FAMILY MEDICINE | Facility: CLINIC | Age: 83
End: 2018-07-19
Payer: COMMERCIAL

## 2018-07-19 VITALS
SYSTOLIC BLOOD PRESSURE: 112 MMHG | HEIGHT: 68 IN | DIASTOLIC BLOOD PRESSURE: 68 MMHG | HEART RATE: 64 BPM | TEMPERATURE: 97.9 F | OXYGEN SATURATION: 95 % | WEIGHT: 186 LBS | BODY MASS INDEX: 28.19 KG/M2

## 2018-07-19 DIAGNOSIS — K21.9 GASTROESOPHAGEAL REFLUX DISEASE, ESOPHAGITIS PRESENCE NOT SPECIFIED: ICD-10-CM

## 2018-07-19 DIAGNOSIS — Z00.00 ROUTINE GENERAL MEDICAL EXAMINATION AT A HEALTH CARE FACILITY: Primary | ICD-10-CM

## 2018-07-19 DIAGNOSIS — Z85.820 HISTORY OF MELANOMA: ICD-10-CM

## 2018-07-19 DIAGNOSIS — D69.6 THROMBOCYTOPENIA (H): ICD-10-CM

## 2018-07-19 DIAGNOSIS — N40.1 BENIGN NON-NODULAR PROSTATIC HYPERPLASIA WITH LOWER URINARY TRACT SYMPTOMS: ICD-10-CM

## 2018-07-19 DIAGNOSIS — E78.5 HYPERLIPIDEMIA WITH TARGET LDL LESS THAN 130: ICD-10-CM

## 2018-07-19 DIAGNOSIS — L57.0 ACTINIC KERATOSES: ICD-10-CM

## 2018-07-19 DIAGNOSIS — N52.01 ERECTILE DYSFUNCTION DUE TO ARTERIAL INSUFFICIENCY: ICD-10-CM

## 2018-07-19 DIAGNOSIS — R33.9 URINARY RETENTION WITH INCOMPLETE BLADDER EMPTYING: ICD-10-CM

## 2018-07-19 LAB
ANION GAP SERPL CALCULATED.3IONS-SCNC: 7 MMOL/L (ref 3–14)
BUN SERPL-MCNC: 26 MG/DL (ref 7–30)
CALCIUM SERPL-MCNC: 9.7 MG/DL (ref 8.5–10.1)
CHLORIDE SERPL-SCNC: 111 MMOL/L (ref 94–109)
CHOLEST SERPL-MCNC: 164 MG/DL
CO2 SERPL-SCNC: 27 MMOL/L (ref 20–32)
CREAT SERPL-MCNC: 1.31 MG/DL (ref 0.66–1.25)
DIFFERENTIAL METHOD BLD: ABNORMAL
EOSINOPHIL # BLD AUTO: 0.2 10E9/L (ref 0–0.7)
EOSINOPHIL NFR BLD AUTO: 1 %
ERYTHROCYTE [DISTWIDTH] IN BLOOD BY AUTOMATED COUNT: 13.9 % (ref 10–15)
ERYTHROCYTE [DISTWIDTH] IN BLOOD BY AUTOMATED COUNT: 13.9 % (ref 10–15)
GFR SERPL CREATININE-BSD FRML MDRD: 52 ML/MIN/1.7M2
GLUCOSE SERPL-MCNC: 101 MG/DL (ref 70–99)
HCT VFR BLD AUTO: 45.5 % (ref 40–53)
HCT VFR BLD AUTO: 45.5 % (ref 40–53)
HDLC SERPL-MCNC: 35 MG/DL
HGB BLD-MCNC: 15.2 G/DL (ref 13.3–17.7)
HGB BLD-MCNC: 15.2 G/DL (ref 13.3–17.7)
LDLC SERPL CALC-MCNC: 110 MG/DL
LYMPHOCYTES # BLD AUTO: 13.5 10E9/L (ref 0.8–5.3)
LYMPHOCYTES NFR BLD AUTO: 70 %
MCH RBC QN AUTO: 32.4 PG (ref 26.5–33)
MCH RBC QN AUTO: 32.4 PG (ref 26.5–33)
MCHC RBC AUTO-ENTMCNC: 33.4 G/DL (ref 31.5–36.5)
MCHC RBC AUTO-ENTMCNC: 33.4 G/DL (ref 31.5–36.5)
MCV RBC AUTO: 97 FL (ref 78–100)
MCV RBC AUTO: 97 FL (ref 78–100)
MONOCYTES # BLD AUTO: 1.2 10E9/L (ref 0–1.3)
MONOCYTES NFR BLD AUTO: 6 %
NEUTROPHILS # BLD AUTO: 4.4 10E9/L (ref 1.6–8.3)
NEUTROPHILS NFR BLD AUTO: 23 %
NONHDLC SERPL-MCNC: 129 MG/DL
PLATELET # BLD AUTO: 156 10E9/L (ref 150–450)
PLATELET # BLD AUTO: 156 10E9/L (ref 150–450)
PLATELET # BLD EST: ABNORMAL 10*3/UL
POTASSIUM SERPL-SCNC: 4.3 MMOL/L (ref 3.4–5.3)
RBC # BLD AUTO: 4.69 10E12/L (ref 4.4–5.9)
RBC # BLD AUTO: 4.69 10E12/L (ref 4.4–5.9)
RBC MORPH BLD: NORMAL
SODIUM SERPL-SCNC: 145 MMOL/L (ref 133–144)
TRIGL SERPL-MCNC: 95 MG/DL
WBC # BLD AUTO: 19.3 10E9/L (ref 4–11)
WBC # BLD AUTO: 19.3 10E9/L (ref 4–11)

## 2018-07-19 PROCEDURE — 99397 PER PM REEVAL EST PAT 65+ YR: CPT | Performed by: FAMILY MEDICINE

## 2018-07-19 PROCEDURE — 17000 DESTRUCT PREMALG LESION: CPT | Performed by: FAMILY MEDICINE

## 2018-07-19 PROCEDURE — 17003 DESTRUCT PREMALG LES 2-14: CPT | Performed by: FAMILY MEDICINE

## 2018-07-19 PROCEDURE — 99213 OFFICE O/P EST LOW 20 MIN: CPT | Mod: 25 | Performed by: FAMILY MEDICINE

## 2018-07-19 PROCEDURE — 80061 LIPID PANEL: CPT | Performed by: FAMILY MEDICINE

## 2018-07-19 PROCEDURE — 36415 COLL VENOUS BLD VENIPUNCTURE: CPT | Performed by: FAMILY MEDICINE

## 2018-07-19 PROCEDURE — 80048 BASIC METABOLIC PNL TOTAL CA: CPT | Performed by: FAMILY MEDICINE

## 2018-07-19 PROCEDURE — 85025 COMPLETE CBC W/AUTO DIFF WBC: CPT | Performed by: FAMILY MEDICINE

## 2018-07-19 RX ORDER — TADALAFIL 20 MG/1
20 TABLET ORAL DAILY PRN
Qty: 6 TABLET | Refills: 1 | Status: CANCELLED | OUTPATIENT
Start: 2018-07-19

## 2018-07-19 RX ORDER — TADALAFIL 20 MG/1
TABLET ORAL
Qty: 6 TABLET | Refills: 5 | Status: SHIPPED | OUTPATIENT
Start: 2018-07-19 | End: 2019-01-22

## 2018-07-19 RX ORDER — PANTOPRAZOLE SODIUM 40 MG/1
40 TABLET, DELAYED RELEASE ORAL DAILY
Qty: 90 TABLET | Refills: 1 | Status: SHIPPED | OUTPATIENT
Start: 2018-07-19 | End: 2019-08-20

## 2018-07-19 ASSESSMENT — ACTIVITIES OF DAILY LIVING (ADL)
I_NEED_ASSISTANCE_FOR_THE_FOLLOWING_DAILY_ACTIVITIES:: NO ASSISTANCE IS NEEDED
CURRENT_FUNCTION: NO ASSISTANCE NEEDED

## 2018-07-19 NOTE — PROGRESS NOTES
SUBJECTIVE:   Vinnie Duran is a 84 year old male who presents for a Preventive Visit and to address some health items of concern.  Are you in the first 12 months of your Medicare coverage?  No    Physical   Annual:     Getting at least 3 servings of Calcium per day:  Yes    Bi-annual eye exam:  Yes    Dental care twice a year:  Yes    Sleep apnea or symptoms of sleep apnea:  Daytime drowsiness    Diet:  Regular (no restrictions)    Frequency of exercise:  None    Taking medications regularly:  Yes    Medication side effects:  Not applicable    Additional concerns today:  YES    Ability to successfully perform activities of daily living: no assistance needed    Home Safety:  Lack of grab bars in the bathroom    Hearing Impairment: feel that people are mumbling or not speaking clearly        Fall risk:  Fallen 2 or more times in the past year?: No  Any fall with injury in the past year?: No    COGNITIVE SCREEN  1) Repeat 3 items (Leader, Season, Table)    2) Clock draw: NORMAL  3) 3 item recall: Recalls 1 object   Results: NORMAL clock, 1-2 items recalled: COGNITIVE IMPAIRMENT LESS LIKELY    Mini-CogTM Copyright JACK Aparicio. Licensed by the author for use in [facility]; reprinted with permission (bryce@[facility]). All rights reserved.        Reviewed and updated as needed this visit by clinical staff         Reviewed and updated as needed this visit by Provider        Social History   Substance Use Topics     Smoking status: Never Smoker     Smokeless tobacco: Never Used     Alcohol use No       Alcohol Use 7/19/2018   If you drink alcohol do you typically have greater than 3 drinks per day OR greater than 7 drinks per week? Not Applicable       Other concerns:     Erectile dysfunction.  Acid reflux at night.  Unstable -- has to hold on to something.  Fatigue -- has to lay down frequently during the day.    He had sent a letter to me in the last week about his erectile dysfunction.  He has tried Cialis in  the past and found that medicine helpful.  He is interested in getting a prescription for that.  He had tried Viagra previously, but it caused headaches.  He is getting some reflux symptoms at night, especially if he eats a lot and then lays down.  He has used Protonix in the past for that and it worked well and wanted to get a prescription for that.  He sometimes feels a bit unsteady in his gait.  He gets tired during the day.  He wonders if these are age-related symptoms.  He still mows the lawn and does other fairly vigorous physical activity, however.  He is on a self cath program because of urinary retention.  He does this twice a day.    Today's PHQ-2 Score:   PHQ-2 ( 1999 [employer]) 7/19/2018   Q1: Little interest or pleasure in doing things 0   Q2: Feeling down, depressed or hopeless 0   PHQ-2 Score 0   Q1: Little interest or pleasure in doing things Not at all   Q2: Feeling down, depressed or hopeless Not at all   PHQ-2 Score 0       Do you feel safe in your environment - Yes    Do you have a Health Care Directive?: No: Advance care planning reviewed with patient; information given to patient to review.    Current providers sharing in care for this patient include:   Patient Care Team:  Navdeep Pabon MD as PCP - General    The following health maintenance items are reviewed in Epic and correct as of today:  Health Maintenance   Topic Date Due     PHQ-2 Q1 YR  11/05/2016     INFLUENZA VACCINE (1) 09/01/2018     ADVANCE DIRECTIVE PLANNING Q5 YRS  03/12/2019     FALL RISK ASSESSMENT  05/16/2019     TETANUS IMMUNIZATION (SYSTEM ASSIGNED)  11/05/2025     PNEUMOCOCCAL  Completed     Patient Active Problem List   Diagnosis     Erectile dysfunction     Hyperlipidemia with target LDL less than 130     Advanced directives, counseling/discussion     Chronic rhinitis     Benign non-nodular prostatic hyperplasia with lower urinary tract symptoms     Thrombocytopenia (H)     Actinic keratoses     Past Surgical History:  "  Procedure Laterality Date     APPENDECTOMY       ARTHROSCOPY KNEE      right     CATARACT IOL, RT/LT  2/11    right     CATARACT IOL, RT/LT  2/13    left     HERNIA REPAIR  1989    bilat inguinal     TONSILLECTOMY       TURP  2008 and 5/18/17       Social History   Substance Use Topics     Smoking status: Never Smoker     Smokeless tobacco: Never Used     Alcohol use No     History reviewed. No pertinent family history.      Current Outpatient Prescriptions   Medication Sig Dispense Refill     APPLE CIDER VINEGAR PO        FISH OIL        ipratropium (ATROVENT) 0.03 % spray Spray 2 sprays into both nostrils every 12 hours 1 Box 5     Multiple Vitamin (ONE-A-DAY MENS PO)          1       5     TURMERIC PO        albuterol (PROAIR HFA/PROVENTIL HFA/VENTOLIN HFA) 108 (90 Base) MCG/ACT Inhaler Inhale 1-2 puffs into the lungs every 6 hours as needed for shortness of breath / dyspnea or wheezing (Patient not taking: Reported on 7/19/2018) 1 Inhaler 0     No Known Allergies        Review of Systems  CONSTITUTIONAL: NEGATIVE for fever, chills, change in weight  INTEGUMENTARY/SKIN: NEGATIVE for worrisome rashes, moles or lesions  EYES: NEGATIVE for vision changes or irritation  ENT/MOUTH: His hearing is not that great  RESP: NEGATIVE for significant cough or SOB  BREAST: NEGATIVE for masses, tenderness or discharge  CV: NEGATIVE for chest pain, palpitations or peripheral edema  GI: See above   male : See above  MUSCULOSKELETAL: NEGATIVE for significant arthralgias or myalgia  NEURO: NEGATIVE for weakness, dizziness or paresthesias  ENDOCRINE: NEGATIVE for temperature intolerance, skin/hair changes  HEME: NEGATIVE for bleeding problems  PSYCHIATRIC: NEGATIVE for changes in mood or affect    OBJECTIVE:   /68 (BP Location: Right arm, Patient Position: Chair, Cuff Size: Adult Regular)  Pulse 64  Temp 97.9  F (36.6  C) (Oral)  Ht 5' 8.11\" (1.73 m)  Wt 186 lb (84.4 kg)  SpO2 95%  BMI 28.19 kg/m2 Estimated body mass " "index is 29.57 kg/(m^2) as calculated from the following:    Height as of 5/1/17: 5' 7.5\" (1.715 m).    Weight as of 5/16/18: 191 lb 9.6 oz (86.9 kg).  Physical Exam  GENERAL: healthy, alert and no distress  EYES: Eyes grossly normal to inspection, PERRL and conjunctivae and sclerae normal  HENT: ear canals and TM's normal, nose and mouth without ulcers or lesions  NECK: no adenopathy, no asymmetry, masses, or scars and thyroid normal to palpation  RESP: lungs clear to auscultation - no rales, rhonchi or wheezes  CV: regular rate and rhythm, normal S1 S2, no S3 or S4, no murmur, click or rub, no peripheral edema and peripheral pulses are intact  ABDOMEN: soft, nontender, no hepatosplenomegaly, no masses   MS: no gross musculoskeletal defects noted, no edema  SKIN: He has a few erythematous scaly areas on his forehead, specifically 2 on the right upper forehead and one in the left upper forehead that look typical for actinic keratoses.  After discussion with the patient, these were frozen with liquid nitrogen.  He has numerous benign-appearing nevi and seborrheic keratoses on his trunk.  No definite signs of skin cancer are seen.  NEURO: Normal strength and tone, mentation intact and speech normal  PSYCH: mentation appears normal, affect normal/bright    Diagnostic Test Results:  none     ASSESSMENT / PLAN:       ICD-10-CM    1. Routine general medical examination at a health care facility Z00.00 Basic metabolic panel   2. Hyperlipidemia with target LDL less than 130 E78.5 Lipid panel reflex to direct LDL Fasting   3. Erectile dysfunction due to arterial insufficiency N52.01 tadalafil (CIALIS) 20 MG tablet   4. Thrombocytopenia (H) D69.6 CBC with platelets   5. Benign non-nodular prostatic hyperplasia with lower urinary tract symptoms N40.1    6. Gastroesophageal reflux disease, esophagitis presence not specified K21.9 pantoprazole (PROTONIX) 40 MG EC tablet   7. Actinic keratoses L57.0 DESTRUCT PREMALIGNANT LESION, " "FIRST     DESTRUCT PREMALIGNANT LESION, 2-14   8. History of melanoma Z85.820    9. Urinary retention with incomplete bladder emptying R33.9      Blood pressure and other vital signs look acceptable  We discussed the above items  We will check fasting labs today as above  I will prescribe Cialis for his erectile dysfunction and pantoprazole for his GERD as requested  Continue routine urology follow-up for the history of urinary retention and BPH  Protect skin from further sun damage  Plan a recheck in 1 year, or sooner prn    End of Life Planning:  Patient currently has an advanced directive: No.  I have verified the patient's ablity to prepare an advanced directive/make health care decisions.  Literature was provided to assist patient in preparing an advanced directive.    COUNSELING:  Reviewed preventive health counseling, as reflected in patient instructions       Regular exercise       Healthy diet/nutrition    BP Readings from Last 1 Encounters:   05/16/18 137/84     Estimated body mass index is 29.57 kg/(m^2) as calculated from the following:    Height as of 5/1/17: 5' 7.5\" (1.715 m).    Weight as of 5/16/18: 191 lb 9.6 oz (86.9 kg).           reports that he has never smoked. He has never used smokeless tobacco.      Appropriate preventive services were discussed with this patient, including applicable screening as appropriate for cardiovascular disease, diabetes, osteopenia/osteoporosis, and glaucoma.  As appropriate for age/gender, discussed screening for colorectal cancer, prostate cancer, breast cancer, and cervical cancer. Checklist reviewing preventive services available has been given to the patient.    Reviewed patients plan of care and provided an AVS. The Basic Care Plan (routine screening as documented in Health Maintenance) for Vinnie meets the Care Plan requirement. This Care Plan has been established and reviewed with the Patient.    Counseling Resources:  ATP IV Guidelines  Pooled Cohorts " Equation Calculator  Breast Cancer Risk Calculator  FRAX Risk Assessment  ICSI Preventive Guidelines  Dietary Guidelines for Americans, 2010  [employer] MyPlate  ASA Prophylaxis  Lung CA Screening    Navdeep Pabon MD  [facility]      Answers for HPI/ROS submitted by the patient on 7/19/2018   PHQ-2 Score: 0

## 2018-07-19 NOTE — MR AVS SNAPSHOT
After Visit Summary   7/19/2018    Vinnie Duran    MRN: [id number]           Patient Information     Date Of Birth          10/12/1933        Visit Information        Provider Department      7/19/2018 9:20 AM Navdeep Pabon MD [facility]        Today's Diagnoses     Routine general medical examination at a health care facility    -  1    Hyperlipidemia with target LDL less than 130        Erectile dysfunction due to arterial insufficiency        Thrombocytopenia (H)        Benign non-nodular prostatic hyperplasia with lower urinary tract symptoms        Gastroesophageal reflux disease, esophagitis presence not specified        Actinic keratoses          Care Instructions      Preventive Health Recommendations:       Male Ages 65 and over    Yearly exam:             See your health care provider every year in order to  o   Review health changes.   o   Discuss preventive care.    o   Review your medicines if your doctor has prescribed any.    Talk with your health care provider about whether you should have a test to screen for prostate cancer (PSA).    Every 3 years, have a diabetes test (fasting glucose). If you are at risk for diabetes, you should have this test more often.    Every 5 years, have a cholesterol test. Have this test more often if you are at risk for high cholesterol or heart disease.     Every 10 years, have a colonoscopy. Or, have a yearly FIT test (stool test). These exams will check for colon cancer.    Talk to with your health care provider about screening for Abdominal Aortic Aneurysm if you have a family history of AAA or have a history of smoking.  Shots:     Get a flu shot each year.     Get a tetanus shot every 10 years.     Talk to your doctor about your pneumonia vaccines. There are now two you should receive - Pneumovax (PPSV 23) and Prevnar (PCV 13).    Talk to your pharmacist about a shingles vaccine.     Talk to your doctor about the  "hepatitis B vaccine.  Nutrition:     Eat at least 5 servings of fruits and vegetables each day.     Eat whole-grain bread, whole-wheat pasta and brown rice instead of white grains and rice.     Get adequate Calcium and Vitamin D.   Lifestyle    Exercise for at least 150 minutes a week (30 minutes a day, 5 days a week). This will help you control your weight and prevent disease.     Limit alcohol to one drink per day.     No smoking.     Wear sunscreen to prevent skin cancer.     See your dentist every six months for an exam and cleaning.     See your eye doctor every 1 to 2 years to screen for conditions such as glaucoma, macular degeneration and cataracts.          Follow-ups after your visit        Who to contact     If you have questions or need follow up information about today's clinic visit or your schedule please contact [facility] directly at [phone].  Normal or non-critical lab and imaging results will be communicated to you by Major Aidehart, letter or phone within 4 business days after the clinic has received the results. If you do not hear from us within 7 days, please contact the clinic through [employer] Aide[employer] or phone. If you have a critical or abnormal lab result, we will notify you by phone as soon as possible.  Submit refill requests through [employer] or call your pharmacy and they will forward the refill request to us. Please allow 3 business days for your refill to be completed.          Additional Information About Your Visit        CarZen Information     [employer] lets you send messages to your doctor, view your test results, renew your prescriptions, schedule appointments and more. To sign up, go to [email or web address] . Click on \"Log in\" on the left side of the screen, which will take you to the Welcome page. Then click on \"Sign up Now\" on the right side of the page.     You will be asked to enter the access code listed below, as well as some personal information. Please follow " "the directions to create your username and password.     Your access code is: [email or web address]  Expires: 2018 10:54 AM     Your access code will  in 90 days. If you need help or a new code, please call your [place] clinic or [phone].        Care EveryWhere ID     This is your Care EveryWhere ID. This could be used by other organizations to access your [place] medical records  [id number]        Your Vitals Were     Pulse Temperature Height Pulse Oximetry BMI (Body Mass Index)       64 97.9  F (36.6  C) (Oral) 5' 8.11\" (1.73 m) 95% 28.19 kg/m2        Blood Pressure from Last 3 Encounters:   18 112/68   18 137/84   17 135/73    Weight from Last 3 Encounters:   18 186 lb (84.4 kg)   18 191 lb 9.6 oz (86.9 kg)   17 188 lb (85.3 kg)              We Performed the Following     Basic metabolic panel     CBC with platelets     Lipid panel reflex to direct LDL Fasting          Today's Medication Changes          These changes are accurate as of 18 10:13 AM.  If you have any questions, ask your nurse or doctor.               Start taking these medicines.        Dose/Directions    pantoprazole 40 MG EC tablet   Commonly known as:  PROTONIX   Used for:  Gastroesophageal reflux disease, esophagitis presence not specified   Started by:  Navdeep Pabon MD        Dose:  40 mg   Take 1 tablet (40 mg) by mouth daily as needed for gastroesophageal reflux disease.  Take 30-60 minutes before a meal.   Quantity:  90 tablet   Refills:  1       tadalafil 20 MG tablet   Commonly known as:  CIALIS   Used for:  Erectile dysfunction due to arterial insufficiency   Started by:  Navdeep Pabon MD        He can 1/2-1 tablet up to once a day as needed before sex.  Never use with nitroglycerin, terazosin or doxazosin.   Quantity:  6 tablet   Refills:  5            Where to get your medicines      These medications were sent to [facility] [address] [facility], [place] - [address]  [address] [address] [place] [facility] [place] [address]     Phone:  [phone]     pantoprazole 40 MG EC tablet         Some of these will need a paper prescription and others can be bought over the counter.  Ask your nurse if you have questions.     Bring a paper prescription for each of these medications     tadalafil 20 MG tablet                Primary Care Provider Office Phone # Fax #    Navdeep Pabon [phone]       [address] [facility] [address]        Equal Access to Services     ROCHELLE INFANTE : Hadii aad ku hadasho Soomaali, waaxda luqadaha, qaybta kaalmada adeegyada, waxay idiin hayaan adeeg kharash lapreet . So [facility] [phone].    ATENCIÓN: Si habla español, tiene a ferro disposición servicios gratuitos de asistencia lingüística. Llame al [phone].    We comply with applicable federal civil rights laws and [place] laws. We do not discriminate on the basis of race, color, national origin, age, disability, sex, sexual orientation, or gender identity.            Thank you!     Thank you for choosing [facility]  for your care. Our goal is always to provide you with excellent care. Hearing back from our patients is one way we can continue to improve our services. Please take a few minutes to complete the written survey that you may receive in the mail after your visit with us. Thank you!             Your Updated Medication List - Protect others around you: Learn how to safely use, store and throw away your medicines at www.disposemymeds.org.          This list is accurate as of 7/19/18 10:13 AM.  Always use your most recent med list.                   Brand Name Dispense Instructions for use Diagnosis    albuterol 108 (90 Base) MCG/ACT Inhaler    PROAIR HFA/PROVENTIL HFA/VENTOLIN HFA    1 Inhaler    Inhale 1-2 puffs into the lungs every 6 hours as needed for shortness of breath / dyspnea or wheezing    Acute bronchospasm       APPLE CIDER VINEGAR PO           FISH OIL            ipratropium 0.03 % spray    ATROVENT    1 Box    Spray 2 sprays into both nostrils every 12 hours    Chronic rhinitis       ONE-A-DAY MENS PO           pantoprazole 40 MG EC tablet    PROTONIX    90 tablet    Take 1 tablet (40 mg) by mouth daily as needed for gastroesophageal reflux disease.  Take 30-60 minutes before a meal.    Gastroesophageal reflux disease, esophagitis presence not specified       tadalafil 20 MG tablet    CIALIS    6 tablet    He can 1/2-1 tablet up to once a day as needed before sex.  Never use with nitroglycerin, terazosin or doxazosin.    Erectile dysfunction due to arterial insufficiency       TURMERIC PO

## 2018-07-19 NOTE — LETTER
[facility]   [address] [place]  [address]  [phone]                                   July 20, 2018    Vinnie Duran  [address]   [place] [address]        Dear Vinnie,    Your cholesterol values generally look good and/or stable from a couple of years ago.  Your creatinine kidney test is mildly elevated and your white blood cell count is elevated.  These elevations are changes from the past.  Your white blood cell count in particular is higher than expected, and could go along with a viral infection or some other process.  I think we should recheck these lab tests in a month or so to see if they are stable or increasing or decreasing.   Please make an appointment at your convenience to return to see me in 1 month so that we can recheck these labs.  You do not need to be fasting for that visit.     Results for orders placed or performed in visit on 07/19/18   Lipid panel reflex to direct LDL Fasting   Result Value Ref Range    Cholesterol 164 <200 mg/dL    Triglycerides 95 <150 mg/dL    HDL Cholesterol 35 (L) >39 mg/dL    LDL Cholesterol Calculated 110 (H) <100 mg/dL    Non HDL Cholesterol 129 <130 mg/dL   Basic metabolic panel   Result Value Ref Range    Sodium 145 (H) 133 - 144 mmol/L    Potassium 4.3 3.4 - 5.3 mmol/L    Chloride 111 (H) 94 - 109 mmol/L    Carbon Dioxide 27 20 - 32 mmol/L    Anion Gap 7 3 - 14 mmol/L    Glucose 101 (H) 70 - 99 mg/dL    Urea Nitrogen 26 7 - 30 mg/dL    Creatinine 1.31 (H) 0.66 - 1.25 mg/dL    GFR Estimate 52 (L) >60 mL/min/1.7m2    GFR Estimate If Black 63 >60 mL/min/1.7m2    Calcium 9.7 8.5 - 10.1 mg/dL   CBC with platelets   Result Value Ref Range    WBC 19.3 (H) 4.0 - 11.0 10e9/L    RBC Count 4.69 4.4 - 5.9 10e12/L    Hemoglobin 15.2 13.3 - 17.7 g/dL    Hematocrit 45.5 40.0 - 53.0 %    MCV 97 78 - 100 fl    MCH 32.4 26.5 - 33.0 pg    MCHC 33.4 31.5 - 36.5 g/dL    RDW 13.9 10.0 - 15.0 %    Platelet Count 156 150 - 450 10e9/L    CBC with platelets and differential   Result Value Ref Range    WBC 19.3 (H) 4.0 - 11.0 10e9/L    RBC Count 4.69 4.4 - 5.9 10e12/L    Hemoglobin 15.2 13.3 - 17.7 g/dL    Hematocrit 45.5 40.0 - 53.0 %    MCV 97 78 - 100 fl    MCH 32.4 26.5 - 33.0 pg    MCHC 33.4 31.5 - 36.5 g/dL    RDW 13.9 10.0 - 15.0 %    Platelet Count 156 150 - 450 10e9/L    % Neutrophils 23.0 %    % Lymphocytes 70.0 %    % Monocytes 6.0 %    % Eosinophils 1.0 %    Absolute Neutrophil 4.4 1.6 - 8.3 10e9/L    Absolute Lymphocytes 13.5 (H) 0.8 - 5.3 10e9/L    Absolute Monocytes 1.2 0.0 - 1.3 10e9/L    Absolute Eosinophils 0.2 0.0 - 0.7 10e9/L    RBC Morphology Normal     Platelet Estimate       Automated count confirmed.  Platelet morphology is normal.    Diff Method Automated Method        If you have any questions please call the clinic at [phone]    Sincerely,    Navdeep Pabon MD  [facility]

## 2018-07-20 PROBLEM — R73.01 IMPAIRED FASTING GLUCOSE: Status: ACTIVE | Noted: 2018-07-01

## 2018-07-20 NOTE — PROGRESS NOTES
Vinnie,  Your cholesterol values generally look good and/or stable from a couple of years ago.  Your creatinine kidney test is mildly elevated and your white blood cell count is elevated.  These elevations are changes from the past.  Your white blood cell count in particular is higher than expected, and could go along with a viral infection or some other process.  I think we should recheck these lab tests in a month or so to see if they are stable or increasing or decreasing.  Please make an appointment at your convenience to return to see me in 1 month so that we can recheck these labs.  You do not need to be fasting for that visit.    Navdeep Pabon MD

## 2018-08-05 ENCOUNTER — TRANSFERRED RECORDS (OUTPATIENT)
Dept: HEALTH INFORMATION MANAGEMENT | Facility: CLINIC | Age: 83
End: 2018-08-05

## 2018-08-06 ENCOUNTER — TELEPHONE (OUTPATIENT)
Dept: FAMILY MEDICINE | Facility: CLINIC | Age: 83
End: 2018-08-06

## 2018-08-06 NOTE — TELEPHONE ENCOUNTER
Reason for Call:  Other     Detailed comments: patient's wife calling  To inform patient was seen in ED over the weekend for a pinched nerve. Patient was prescribed antibiotics and pain medication. Wife states mediation is working and they do not need a call back.    Phone Number Patient can be reached at: Home number on file [phone] (home)    Best Time: only if there are any questions for patient    Can we leave a detailed message on this number? Not Applicable    Call taken on 8/6/2018 at 2:27 PM by Lexy Chavez

## 2018-08-15 ENCOUNTER — TELEPHONE (OUTPATIENT)
Dept: FAMILY MEDICINE | Facility: CLINIC | Age: 83
End: 2018-08-15

## 2018-08-15 ENCOUNTER — MEDICAL CORRESPONDENCE (OUTPATIENT)
Dept: HEALTH INFORMATION MANAGEMENT | Facility: CLINIC | Age: 83
End: 2018-08-15

## 2018-08-15 NOTE — TELEPHONE ENCOUNTER
Patient and wife are asking if PCP can order Lower back Xray - Stand up,  for Left sided lower back pain?  Chiropractor ordered this but it is not at a convenient place/not close to home for them.  Chiropractor is limited on where he can order xray.  Xray order be placed to a [facility] [place]?  Please advise.  Thank you.  Shirlene Patrick RN

## 2018-08-15 NOTE — TELEPHONE ENCOUNTER
Reason for Call:  Other - Patient Request    Detailed comments: Pat, patient's spouse, called and stated patient has been dealing with a lot of pain. He has been seeing a chiropractor. The chiropractor suggested that he have a stand-up xray done. Pat asked if Dr. Pabon could order this xray for patient., She would like to have it done closer to home. Please call to discuss.    Phone Number Patient can be reached at: Home number on file [phone] (home)    Best Time: Anytime    Can we leave a detailed message on this number? YES    Call taken on 8/15/2018 at 8:50 AM by Cintia Caruso

## 2018-08-15 NOTE — TELEPHONE ENCOUNTER
Informed wife and patient of below.    Xray should be ordered by PCP due to insurance reasons.  Insurance won't cover Back Xray from Chiropractor Dr. Herb Sagastume ([facility] [place]).  Order will need to come from PCP in order for Insurance to cover.    Spoke with Rekha at [facility] office. She will fax copy of order to PCP's attention for review. Will await fax.    Shirlene Patrick RN

## 2018-08-15 NOTE — TELEPHONE ENCOUNTER
It would be preferable for the chiropractor to order the x-ray.  He can order exactly the type of x-ray that he wants and then the results will go to him.  It should just be a one time event for them to have it done, so even if it is not an optimal location, I would not expect that they would have to go back there again.

## 2018-08-16 NOTE — TELEPHONE ENCOUNTER
Xray order received and placed in provider's basket. Patient does have an appointment with PCP on 08/20. Can it be done here instead of sending order somewhere else?

## 2018-08-16 NOTE — TELEPHONE ENCOUNTER
TC huddled with PCP regarding Xray order. He will address at office visit on Monday. TC contacted patient and communicated that we will address completing the xray at the office visit on 08/20.

## 2018-08-20 ENCOUNTER — RADIANT APPOINTMENT (OUTPATIENT)
Dept: GENERAL RADIOLOGY | Facility: CLINIC | Age: 83
End: 2018-08-20
Attending: FAMILY MEDICINE
Payer: COMMERCIAL

## 2018-08-20 ENCOUNTER — OFFICE VISIT (OUTPATIENT)
Dept: FAMILY MEDICINE | Facility: CLINIC | Age: 83
End: 2018-08-20
Payer: COMMERCIAL

## 2018-08-20 VITALS
TEMPERATURE: 97.9 F | DIASTOLIC BLOOD PRESSURE: 79 MMHG | WEIGHT: 186 LBS | HEART RATE: 64 BPM | BODY MASS INDEX: 28.19 KG/M2 | SYSTOLIC BLOOD PRESSURE: 129 MMHG | OXYGEN SATURATION: 94 %

## 2018-08-20 DIAGNOSIS — M54.42 LEFT-SIDED LOW BACK PAIN WITH LEFT-SIDED SCIATICA, UNSPECIFIED CHRONICITY: Primary | ICD-10-CM

## 2018-08-20 DIAGNOSIS — R79.89 ELEVATED SERUM CREATININE: ICD-10-CM

## 2018-08-20 DIAGNOSIS — D72.820 LYMPHOCYTOSIS: ICD-10-CM

## 2018-08-20 LAB
ANION GAP SERPL CALCULATED.3IONS-SCNC: 6 MMOL/L (ref 3–14)
BUN SERPL-MCNC: 20 MG/DL (ref 7–30)
CALCIUM SERPL-MCNC: 9.7 MG/DL (ref 8.5–10.1)
CHLORIDE SERPL-SCNC: 108 MMOL/L (ref 94–109)
CO2 SERPL-SCNC: 29 MMOL/L (ref 20–32)
CREAT SERPL-MCNC: 1.28 MG/DL (ref 0.66–1.25)
DIFFERENTIAL METHOD BLD: ABNORMAL
EOSINOPHIL # BLD AUTO: 0.6 10E9/L (ref 0–0.7)
EOSINOPHIL NFR BLD AUTO: 3 %
ERYTHROCYTE [DISTWIDTH] IN BLOOD BY AUTOMATED COUNT: 13.5 % (ref 10–15)
GFR SERPL CREATININE-BSD FRML MDRD: 53 ML/MIN/1.7M2
GLUCOSE SERPL-MCNC: 98 MG/DL (ref 70–99)
HCT VFR BLD AUTO: 46.5 % (ref 40–53)
HGB BLD-MCNC: 15.8 G/DL (ref 13.3–17.7)
LYMPHOCYTES # BLD AUTO: 12.7 10E9/L (ref 0.8–5.3)
LYMPHOCYTES NFR BLD AUTO: 63 %
MCH RBC QN AUTO: 32.3 PG (ref 26.5–33)
MCHC RBC AUTO-ENTMCNC: 34 G/DL (ref 31.5–36.5)
MCV RBC AUTO: 95 FL (ref 78–100)
MONOCYTES # BLD AUTO: 0.4 10E9/L (ref 0–1.3)
MONOCYTES NFR BLD AUTO: 2 %
NEUTROPHILS # BLD AUTO: 6.4 10E9/L (ref 1.6–8.3)
NEUTROPHILS NFR BLD AUTO: 32 %
PLATELET # BLD AUTO: 178 10E9/L (ref 150–450)
PLATELET # BLD EST: ABNORMAL 10*3/UL
POTASSIUM SERPL-SCNC: 4.7 MMOL/L (ref 3.4–5.3)
RBC # BLD AUTO: 4.89 10E12/L (ref 4.4–5.9)
RBC MORPH BLD: NORMAL
SMUDGE CELLS BLD QL SMEAR: PRESENT
SODIUM SERPL-SCNC: 143 MMOL/L (ref 133–144)
WBC # BLD AUTO: 20.1 10E9/L (ref 4–11)

## 2018-08-20 PROCEDURE — 80048 BASIC METABOLIC PNL TOTAL CA: CPT | Performed by: FAMILY MEDICINE

## 2018-08-20 PROCEDURE — 99214 OFFICE O/P EST MOD 30 MIN: CPT | Performed by: FAMILY MEDICINE

## 2018-08-20 PROCEDURE — 85025 COMPLETE CBC W/AUTO DIFF WBC: CPT | Performed by: FAMILY MEDICINE

## 2018-08-20 PROCEDURE — 72100 X-RAY EXAM L-S SPINE 2/3 VWS: CPT | Mod: FY

## 2018-08-20 PROCEDURE — 36415 COLL VENOUS BLD VENIPUNCTURE: CPT | Performed by: FAMILY MEDICINE

## 2018-08-20 ASSESSMENT — PAIN SCALES - GENERAL: PAINLEVEL: MODERATE PAIN (5)

## 2018-08-20 NOTE — PROGRESS NOTES
SUBJECTIVE:   Vinnie Duran is a 84 year old male who presents to clinic today for the following health issues:      Recheck of labs from last office visit.  Requesting an x-ray per chiropractor.    He went to the ER a couple of weeks ago on August 5 with left low back and leg pain.  It started after he was doing a lot of lawn mowing.  No specific injury.  He was felt to have left sciatica.  He was given a prescription for Norco pain pills.  He has used those up.  He is still having left low back and leg pain.  He has seen a chiropractor for this.  He has had 3 sessions so far.  He will be seeing the chiropractor again this afternoon.  His chiropractor, Dr. Herb Sagastume, would like him to get x-rays done.  Apparently insurance will not cover the x-rays if the chiropractor orders them, but they will if I order them.  The patient would like to have the x-rays done today and then bring a disc of the x-rays to his chiropractor appointment this afternoon.    I had also wanted to see the patient for follow-up on his elevated creatinine and white blood cell count from a month ago.  See previous note on that.    Problem list and histories reviewed & adjusted, as indicated.  Additional history: as documented    Patient Active Problem List   Diagnosis     Erectile dysfunction     Hyperlipidemia with target LDL less than 130     Advanced directives, counseling/discussion     Chronic rhinitis     Benign non-nodular prostatic hyperplasia with lower urinary tract symptoms     Thrombocytopenia (H)     Actinic keratoses     Gastroesophageal reflux disease, esophagitis presence not specified     History of melanoma     Urinary retention with incomplete bladder emptying     Impaired fasting glucose     Past Surgical History:   Procedure Laterality Date     APPENDECTOMY       ARTHROSCOPY KNEE      right     CATARACT IOL, RT/LT  2/11    right     CATARACT IOL, RT/LT  2/13    left     HERNIA REPAIR  1989    bilat inguinal      TONSILLECTOMY       TURP  2008 and 5/18/17       Social History   Substance Use Topics     Smoking status: Never Smoker     Smokeless tobacco: Never Used     Alcohol use No     History reviewed. No pertinent family history.      Current Outpatient Prescriptions   Medication Sig Dispense Refill     APPLE CIDER VINEGAR PO        FISH OIL        ipratropium (ATROVENT) 0.03 % spray Spray 2 sprays into both nostrils every 12 hours 1 Box 5     Multiple Vitamin (ONE-A-DAY MENS PO)        pantoprazole (PROTONIX) 40 MG EC tablet Take 1 tablet (40 mg) by mouth daily as needed for gastroesophageal reflux disease.  Take 30-60 minutes before a meal. 90 tablet 1     tadalafil (CIALIS) 20 MG tablet He can 1/2-1 tablet up to once a day as needed before sex.  Never use with nitroglycerin, terazosin or doxazosin. 6 tablet 5     TURMERIC PO        albuterol (PROAIR HFA/PROVENTIL HFA/VENTOLIN HFA) 108 (90 Base) MCG/ACT Inhaler Inhale 1-2 puffs into the lungs every 6 hours as needed for shortness of breath / dyspnea or wheezing (Patient not taking: Reported on 7/19/2018) 1 Inhaler 0     No Known Allergies    Reviewed and updated as needed this visit by clinical staff  Tobacco  Allergies  Meds  Med Hx  Surg Hx  Fam Hx  Soc Hx      Reviewed and updated as needed this visit by Provider         ROS:  Noncontributory except as above    OBJECTIVE:     /79 (BP Location: Right arm, Patient Position: Chair, Cuff Size: Adult Regular)  Pulse 64  Temp 97.9  F (36.6  C) (Oral)  Wt 186 lb (84.4 kg)  SpO2 94%  BMI 28.19 kg/m2  Body mass index is 28.19 kg/(m^2).  GENERAL: healthy, alert and no distress  BACK: He has mild discomfort to palpation over the left low back.  He has some positive straight leg raising signs on the left at about 80 .  NEURO: Sensation and motor function are grossly intact in the left lower extremity.    Diagnostic Test Results:  Office Visit on 07/19/2018   Component Date Value Ref Range Status     Cholesterol  07/19/2018 164  <200 mg/dL Final     Triglycerides 07/19/2018 95  <150 mg/dL Final    Fasting specimen     HDL Cholesterol 07/19/2018 35* >39 mg/dL Final     LDL Cholesterol Calculated 07/19/2018 110* <100 mg/dL Final    Comment: Above desirable:  100-129 mg/dl  Borderline High:  130-159 mg/dL  High:             160-189 mg/dL  Very high:       >189 mg/dl       Non HDL Cholesterol 07/19/2018 129  <130 mg/dL Final     Sodium 07/19/2018 145* 133 - 144 mmol/L Final     Potassium 07/19/2018 4.3  3.4 - 5.3 mmol/L Final     Chloride 07/19/2018 111* 94 - 109 mmol/L Final     Carbon Dioxide 07/19/2018 27  20 - 32 mmol/L Final     Anion Gap 07/19/2018 7  3 - 14 mmol/L Final     Glucose 07/19/2018 101* 70 - 99 mg/dL Final    Fasting specimen     Urea Nitrogen 07/19/2018 26  7 - 30 mg/dL Final     Creatinine 07/19/2018 1.31* 0.66 - 1.25 mg/dL Final     GFR Estimate 07/19/2018 52* >60 mL/min/1.7m2 Final    Non  GFR Calc     GFR Estimate If Black 07/19/2018 63  >60 mL/min/1.7m2 Final    African American GFR Calc     Calcium 07/19/2018 9.7  8.5 - 10.1 mg/dL Final     WBC 07/19/2018 19.3* 4.0 - 11.0 10e9/L Final     RBC Count 07/19/2018 4.69  4.4 - 5.9 10e12/L Final     Hemoglobin 07/19/2018 15.2  13.3 - 17.7 g/dL Final     Hematocrit 07/19/2018 45.5  40.0 - 53.0 % Final     MCV 07/19/2018 97  78 - 100 fl Final     MCH 07/19/2018 32.4  26.5 - 33.0 pg Final     MCHC 07/19/2018 33.4  31.5 - 36.5 g/dL Final     RDW 07/19/2018 13.9  10.0 - 15.0 % Final     Platelet Count 07/19/2018 156  150 - 450 10e9/L Final     WBC 07/19/2018 19.3* 4.0 - 11.0 10e9/L Final     RBC Count 07/19/2018 4.69  4.4 - 5.9 10e12/L Final     Hemoglobin 07/19/2018 15.2  13.3 - 17.7 g/dL Final     Hematocrit 07/19/2018 45.5  40.0 - 53.0 % Final     MCV 07/19/2018 97  78 - 100 fl Final     MCH 07/19/2018 32.4  26.5 - 33.0 pg Final     MCHC 07/19/2018 33.4  31.5 - 36.5 g/dL Final     RDW 07/19/2018 13.9  10.0 - 15.0 % Final     Platelet Count  07/19/2018 156  150 - 450 10e9/L Final     % Neutrophils 07/19/2018 23.0  % Final     % Lymphocytes 07/19/2018 70.0  % Final     % Monocytes 07/19/2018 6.0  % Final     % Eosinophils 07/19/2018 1.0  % Final     Absolute Neutrophil 07/19/2018 4.4  1.6 - 8.3 10e9/L Final     Absolute Lymphocytes 07/19/2018 13.5* 0.8 - 5.3 10e9/L Final     Absolute Monocytes 07/19/2018 1.2  0.0 - 1.3 10e9/L Final     Absolute Eosinophils 07/19/2018 0.2  0.0 - 0.7 10e9/L Final     RBC Morphology 07/19/2018 Normal   Final     Platelet Estimate 07/19/2018 Automated count confirmed.  Platelet morphology is normal.   Final     Diff Method 07/19/2018 Automated Method   Final         ASSESSMENT/PLAN:       ICD-10-CM    1. Left-sided low back pain with left-sided sciatica, unspecified chronicity M54.42 XR Lumbar Spine 2/3 Views   2. Elevated serum creatinine R79.89 Basic metabolic panel   3. Lymphocytosis D72.820 CBC with platelets and differential     We discussed his left sided sciatica and we will go ahead and obtain lumbar spine xrays as requested by his chiropractor  We will give the patient a disc copy of the images so that he can take these with him for his appointment this afternoon  I did discuss the possibility of physical therapy at some point as well as an MRI scan of his lumbar spine film if symptoms persist despite chiropractic treatment    We will check repeat lab work as above and inform him of those results and any further plan based on those results    Navdeep Pabon MD  [facility]

## 2018-08-21 ENCOUNTER — TELEPHONE (OUTPATIENT)
Dept: FAMILY MEDICINE | Facility: CLINIC | Age: 83
End: 2018-08-21

## 2018-08-21 DIAGNOSIS — M54.42 LEFT-SIDED LOW BACK PAIN WITH LEFT-SIDED SCIATICA, UNSPECIFIED CHRONICITY: Primary | ICD-10-CM

## 2018-08-21 NOTE — TELEPHONE ENCOUNTER
I returned the patient's call.  He is having a lot of trouble with his back and leg pain.  He saw the chiropractor yesterday and will be seeing the chiropractor again next week.  He wondered what other options there might be for treatment.  I offered him consultation with 1 of our medical spine specialists and he wanted to do that, so a referral was made for him.  He did have plain film lumbar spine x-rays done yesterday that were requested by the chiropractor and they did show some abnormalities as per the chart.

## 2018-08-21 NOTE — PROGRESS NOTES
I called the patient with his lab results.  His white blood cell count remains elevated with a lymphocytosis.  I am concerned this may represent CLL or some similar process.  I recommended consultation with a hematologist.  The patient is having a lot of low back pain currently and does not feel like he wants to see a hematologist currently.  We will plan to readdress this in a few weeks when his back pain gets better managed.

## 2018-08-21 NOTE — TELEPHONE ENCOUNTER
Reason for Call:  Other - patient request: Call Back    Detailed comments: Patient called and stated he needed to speak with Dr. Pabon on the phone right away or by email. Informed patient we cannot give out provider's personal e-mail. Patient expressed it is very important that Dr. Pabon call him back as soon as possible this morning. Scheduled inquired what patient's call was regarding. Patient stated he only would discuss this with Dr. Pabon and it is urgent. Please call patient back.     Phone Number Patient can be reached at: Home number on file [phone] (home)    Best Time: As soon as possible.    Can we leave a detailed message on this number? YES    Call taken on 8/21/2018 at 7:48 AM by Cintia Caruso

## 2018-08-30 ENCOUNTER — OFFICE VISIT (OUTPATIENT)
Dept: NEUROSURGERY | Facility: CLINIC | Age: 83
End: 2018-08-30
Payer: COMMERCIAL

## 2018-08-30 ENCOUNTER — RADIANT APPOINTMENT (OUTPATIENT)
Dept: MRI IMAGING | Facility: CLINIC | Age: 83
End: 2018-08-30
Attending: NURSE PRACTITIONER
Payer: COMMERCIAL

## 2018-08-30 VITALS
SYSTOLIC BLOOD PRESSURE: 130 MMHG | DIASTOLIC BLOOD PRESSURE: 79 MMHG | OXYGEN SATURATION: 95 % | HEIGHT: 69 IN | TEMPERATURE: 97.3 F | HEART RATE: 75 BPM | WEIGHT: 186 LBS | BODY MASS INDEX: 27.55 KG/M2

## 2018-08-30 DIAGNOSIS — M51.369 LUMBAR DEGENERATIVE DISC DISEASE: ICD-10-CM

## 2018-08-30 DIAGNOSIS — M51.369 LUMBAR DEGENERATIVE DISC DISEASE: Primary | ICD-10-CM

## 2018-08-30 PROCEDURE — 72148 MRI LUMBAR SPINE W/O DYE: CPT | Mod: TC

## 2018-08-30 PROCEDURE — 99203 OFFICE O/P NEW LOW 30 MIN: CPT | Performed by: NURSE PRACTITIONER

## 2018-08-30 ASSESSMENT — PAIN SCALES - GENERAL: PAINLEVEL: MODERATE PAIN (4)

## 2018-08-30 NOTE — LETTER
"    8/30/2018         RE: Vinnie Duran  [address]   [place] [address]        Dear Colleague,    Thank you for referring your patient, Vinnie Duran, to the [facility]. Please see a copy of my visit note below.    Dr. Satinder Stacy  [facility]  Neurosurgery Clinic Visit        CC: Left low back pain    Primary care Provider: Navdeep Pabon      Reason For Visit:   I was asked by Dr. Pabon to consult on the patient for left-sided low back pain.      HPI: Vinnie Duran is an 84 year old male with low back pain for the past 3 weeks.  He states he had been mowing lawn and recalls starting to feel pain to the left lower back.  He states the pain gradually increased and then a couple days later he started to have pain extending into the left lateral thigh, past the knee and into the ankle.  After a few days the pain in the LLE improved, however, his back pain has remained constant, \"It's a grabbing pain.\"  He states it tends to increase with activities for any extended amount of time.  He is most comfortable while seated.  He denies falls or weakness to BLE.  He denies RLE pain.  He denies changes to bowel or bladder.  He has seen a chiropractor x5 with minimal benefit.  He has not had injections or PT.     Pain right now:  4    Past Medical History:   Diagnosis Date     Actinic keratoses 7/19/2018     BPH (benign prostatic hypertrophy)      Erectile dysfunction      GERD (gastroesophageal reflux disease)      Hyperlipidemia LDL goal < 130      Impaired fasting glucose 07/2018     Melanoma (H) 2/03    superficial spreading       Past Medical History reviewed with patient during visit.    Past Surgical History:   Procedure Laterality Date     APPENDECTOMY       ARTHROSCOPY KNEE      right     CATARACT IOL, RT/LT  2/11    right     CATARACT IOL, RT/LT  2/13    left     HERNIA REPAIR  1989    bilat inguinal     TONSILLECTOMY       TURP  2008 and 5/18/17     Past " "Surgical History reviewed with patient during visit.    Current Outpatient Prescriptions   Medication     APPLE CIDER VINEGAR PO     FISH OIL     ipratropium (ATROVENT) 0.03 % spray     Multiple Vitamin (ONE-A-DAY MENS PO)     pantoprazole (PROTONIX) 40 MG EC tablet     tadalafil (CIALIS) 20 MG tablet     TURMERIC PO     albuterol (PROAIR HFA/PROVENTIL HFA/VENTOLIN HFA) 108 (90 Base) MCG/ACT Inhaler     No current facility-administered medications for this visit.        No Known Allergies    Social History     Social History     Marital status:      Spouse name: N/A     Number of children: N/A     Years of education: N/A     Social History Main Topics     Smoking status: Former Smoker     Smokeless tobacco: Never Used      Comment: high school     Alcohol use No     Drug use: No     Sexual activity: Yes     Partners: Female     Other Topics Concern     Parent/Sibling W/ Cabg, Mi Or Angioplasty Before 65f 55m? No     Social History Narrative       History reviewed. No pertinent family history.      ROS: 10 point ROS neg other than the symptoms noted above in the HPI.    Vital Signs: /79  Pulse 75  Temp 97.3  F (36.3  C) (Oral)  Ht 5' 9\" (1.753 m)  Wt 186 lb (84.4 kg)  SpO2 95%  BMI 27.47 kg/m2    Examination:  Constitutional:  Alert, well nourished, NAD.  HEENT: Normocephalic, atraumatic.   Pulm:  Without shortness of breath   CV:  No pitting edema of BLE.    Neurological:  Awake  Alert  Oriented x 3  Speech clear  Cranial nerves II - XII intact  PERRL  EOMI  Face symmetric  Tongue midline  Motor exam   Shoulder Abduction:  Right:  5/5   Left:  5/5  Biceps:                      Right:  5/5   Left:  5/5  Triceps:                     Right:  5/5   Left:  5/5  Wrist Extensors:       Right:  5/5   Left:  5/5  Wrist Flexors:           Right:  5/5   Left:  5/5  Intrinsics:                   Right:  5/5   Left:  5/5   Hip Flexor:                Right: 5/5  Left:  5/5  Hip Adductor:             Right:  5/5 " " Left:  5/5  Hip Abductor:             Right:  5/5  Left:  5/5  Gastroc Soleus:        Right:  5/5  Left:  5/5  Tib/Ant:                      Right:  5/5  Left:  5/5  EHL:                          Right:  5/5  Left:  5/5   Sensation normal to bilateral upper and lower extremities  Clonus negative  DTRs 1+ symmetric  Gait: Able to stand from a seated position. Normal non-antalgic, non-myelopathic gait.  Cervical examination reveals good range of motion.  No tenderness to palpation of the cervical spine or paraspinous muscles bilaterally.    Lumbar examination reveals very minimal tenderness to palpation of the left paraspinous muscles. No midline tenderness to palpation. Hip height is symmetrical. Negative SI joint, sciatic notch or greater trochanteric tenderness to palpation bilaterally.  Straight leg raise is negative bilaterally.  No pain with ROM.    Imaging: Lumbar XR 8/20/18:  IMPRESSION: Mild lumbar curve to the left. Mild wedging of the L1  vertebral body possibly due to old fracture deformity. Clinical  correlation recommended. Advanced degenerative disc disease at the L2 level. Prominent degenerative facet disease lower lumbar spine. No other findings.       Assessment/Plan:   Lumbar DDD  Lumbar Radiculopathy    Vinnie Duran is an 84 year old male with low back pain for the past 3 weeks.  He states he had been mowing lawn and recalls starting to feel pain to the left lower back.  He states the pain gradually increased and then a couple days later he started to have pain extending into the left lateral thigh, past the knee and into the ankle.  After a few days the pain in the LLE improved, however, his back pain has remained constant, \"It's a grabbing pain.\"  He states it tends to increase with activities for any extended amount of time.  He is most comfortable while seated.  He denies falls or weakness to BLE.  He denies RLE pain.  He denies changes to bowel or bladder.  He has seen a chiropractor x5 " with minimal benefit.  He has not had injections or PT. We reviewed Xrays today noting wedging of L1 vertebral body which could be old fracture.  We are recommend a lumbar MRI for more detailed imaging.  If fracture noted to be more acute/subacute we discussed likely bracing.  The patient will schedule the MRI within the week and we will contact him with results.  If the pain increases and/or weakness he will contact us sooner.     Patient Instructions   -Schedule lumbar MRI  -We will call you with results  -Please contact the clinic if pain persists at [phone].    Jackelyn Luna CNP  [facility]  [address] [address]    Tel [phone]  Pager [phone]      Again, thank you for allowing me to participate in the care of your patient.        Sincerely,        Jackelyn Luna, NP

## 2018-08-30 NOTE — PROGRESS NOTES
"Dr. Satinder Stacy  [facility]  Neurosurgery Clinic Visit        CC: Left low back pain    Primary care Provider: Navdeep Pabon      Reason For Visit:   I was asked by Dr. Pabon to consult on the patient for left-sided low back pain.      HPI: Vinnie Duran is an 84 year old male with low back pain for the past 3 weeks.  He states he had been mowing lawn and recalls starting to feel pain to the left lower back.  He states the pain gradually increased and then a couple days later he started to have pain extending into the left lateral thigh, past the knee and into the ankle.  After a few days the pain in the LLE improved, however, his back pain has remained constant, \"It's a grabbing pain.\"  He states it tends to increase with activities for any extended amount of time.  He is most comfortable while seated.  He denies falls or weakness to BLE.  He denies RLE pain.  He denies changes to bowel or bladder.  He has seen a chiropractor x5 with minimal benefit.  He has not had injections or PT.     Pain right now:  4    Past Medical History:   Diagnosis Date     Actinic keratoses 7/19/2018     BPH (benign prostatic hypertrophy)      Erectile dysfunction      GERD (gastroesophageal reflux disease)      Hyperlipidemia LDL goal < 130      Impaired fasting glucose 07/2018     Melanoma (H) 2/03    superficial spreading       Past Medical History reviewed with patient during visit.    Past Surgical History:   Procedure Laterality Date     APPENDECTOMY       ARTHROSCOPY KNEE      right     CATARACT IOL, RT/LT  2/11    right     CATARACT IOL, RT/LT  2/13    left     HERNIA REPAIR  1989    bilat inguinal     TONSILLECTOMY       TURP  2008 and 5/18/17     Past Surgical History reviewed with patient during visit.    Current Outpatient Prescriptions   Medication     APPLE CIDER VINEGAR PO     FISH OIL     ipratropium (ATROVENT) 0.03 % spray     Multiple Vitamin (ONE-A-DAY MENS PO)     pantoprazole " "(PROTONIX) 40 MG EC tablet     tadalafil (CIALIS) 20 MG tablet     TURMERIC PO     albuterol (PROAIR HFA/PROVENTIL HFA/VENTOLIN HFA) 108 (90 Base) MCG/ACT Inhaler     No current facility-administered medications for this visit.        No Known Allergies    Social History     Social History     Marital status:      Spouse name: N/A     Number of children: N/A     Years of education: N/A     Social History Main Topics     Smoking status: Former Smoker     Smokeless tobacco: Never Used      Comment: high school     Alcohol use No     Drug use: No     Sexual activity: Yes     Partners: Female     Other Topics Concern     Parent/Sibling W/ Cabg, Mi Or Angioplasty Before 65f 55m? No     Social History Narrative       History reviewed. No pertinent family history.      ROS: 10 point ROS neg other than the symptoms noted above in the HPI.    Vital Signs: /79  Pulse 75  Temp 97.3  F (36.3  C) (Oral)  Ht 5' 9\" (1.753 m)  Wt 186 lb (84.4 kg)  SpO2 95%  BMI 27.47 kg/m2    Examination:  Constitutional:  Alert, well nourished, NAD.  HEENT: Normocephalic, atraumatic.   Pulm:  Without shortness of breath   CV:  No pitting edema of BLE.    Neurological:  Awake  Alert  Oriented x 3  Speech clear  Cranial nerves II - XII intact  PERRL  EOMI  Face symmetric  Tongue midline  Motor exam   Shoulder Abduction:  Right:  5/5   Left:  5/5  Biceps:                      Right:  5/5   Left:  5/5  Triceps:                     Right:  5/5   Left:  5/5  Wrist Extensors:       Right:  5/5   Left:  5/5  Wrist Flexors:           Right:  5/5   Left:  5/5  Intrinsics:                   Right:  5/5   Left:  5/5   Hip Flexor:                Right: 5/5  Left:  5/5  Hip Adductor:             Right:  5/5  Left:  5/5  Hip Abductor:             Right:  5/5  Left:  5/5  Gastroc Soleus:        Right:  5/5  Left:  5/5  Tib/Ant:                      Right:  5/5  Left:  5/5  EHL:                          Right:  5/5  Left:  5/5   Sensation normal " "to bilateral upper and lower extremities  Clonus negative  DTRs 1+ symmetric  Gait: Able to stand from a seated position. Normal non-antalgic, non-myelopathic gait.  Cervical examination reveals good range of motion.  No tenderness to palpation of the cervical spine or paraspinous muscles bilaterally.    Lumbar examination reveals very minimal tenderness to palpation of the left paraspinous muscles. No midline tenderness to palpation. Hip height is symmetrical. Negative SI joint, sciatic notch or greater trochanteric tenderness to palpation bilaterally.  Straight leg raise is negative bilaterally.  No pain with ROM.    Imaging: Lumbar XR 8/20/18:  IMPRESSION: Mild lumbar curve to the left. Mild wedging of the L1  vertebral body possibly due to old fracture deformity. Clinical  correlation recommended. Advanced degenerative disc disease at the L2 level. Prominent degenerative facet disease lower lumbar spine. No other findings.       Assessment/Plan:   Lumbar DDD  Lumbar Radiculopathy    Vinnie Duran is an 84 year old male with low back pain for the past 3 weeks.  He states he had been mowing lawn and recalls starting to feel pain to the left lower back.  He states the pain gradually increased and then a couple days later he started to have pain extending into the left lateral thigh, past the knee and into the ankle.  After a few days the pain in the LLE improved, however, his back pain has remained constant, \"It's a grabbing pain.\"  He states it tends to increase with activities for any extended amount of time.  He is most comfortable while seated.  He denies falls or weakness to BLE.  He denies RLE pain.  He denies changes to bowel or bladder.  He has seen a chiropractor x5 with minimal benefit.  He has not had injections or PT. We reviewed Xrays today noting wedging of L1 vertebral body which could be old fracture.  We are recommend a lumbar MRI for more detailed imaging.  If fracture noted to be more " acute/subacute we discussed likely bracing.  The patient will schedule the MRI within the week and we will contact him with results.  If the pain increases and/or weakness he will contact us sooner.     Patient Instructions   -Schedule lumbar MRI  -We will call you with results  -Please contact the clinic if pain persists at [phone].    Jackelyn Luna [facility]  [facility]  [address] [address]    Tel [phone]  Pager [phone]

## 2018-08-30 NOTE — MR AVS SNAPSHOT
"              After Visit Summary   2018    Vinnie Duran    MRN: [id number]           Patient Information     Date Of Birth          10/12/1933        Visit Information        Provider Department      2018 11:40 AM Jackelyn Luna NP [facility]        Care Instructions    -Schedule lumbar MRI  -We will call you with results  -Please contact the clinic if pain persists at [phone].            Follow-ups after your visit        Who to contact     If you have questions or need follow up information about today's clinic visit or your schedule please contact [facility] directly at [phone].  Normal or non-critical lab and imaging results will be communicated to you by MyChart, letter or phone within 4 business days after the clinic has received the results. If you do not hear from us within 7 days, please contact the clinic through MyChart or phone. If you have a critical or abnormal lab result, we will notify you by phone as soon as possible.  Submit refill requests through [employer] or call your pharmacy and they will forward the refill request to us. Please allow 3 business days for your refill to be completed.          Additional Information About Your Visit        MyChart Information     [employer] lets you send messages to your doctor, view your test results, renew your prescriptions, schedule appointments and more. To sign up, go to [email or web address] . Click on \"Log in\" on the left side of the screen, which will take you to the Welcome page. Then click on \"Sign up Now\" on the right side of the page.     You will be asked to enter the access code listed below, as well as some personal information. Please follow the directions to create your username and password.     Your access code is: [email or web address]  Expires: 2018  8:51 AM     Your access code will  in 90 days. If you need help or a new code, please call your [facility] or [phone].      " "  Care EveryWhere ID     This is your Care EveryWhere ID. This could be used by other organizations to access your [place] medical records  [id number]        Your Vitals Were     Pulse Temperature Height Pulse Oximetry BMI (Body Mass Index)       75 97.3  F (36.3  C) (Oral) 5' 9\" (1.753 m) 95% 27.47 kg/m2        Blood Pressure from Last 3 Encounters:   08/30/18 130/79   08/20/18 129/79   07/19/18 112/68    Weight from Last 3 Encounters:   08/30/18 186 lb (84.4 kg)   08/20/18 186 lb (84.4 kg)   07/19/18 186 lb (84.4 kg)              Today, you had the following     No orders found for display       Primary Care Provider Office Phone # Fax #    Navdeep Pabon [phone]       [address] [facility] [address]        Equal Access to Services     [facility] : Hadii ilda mengo Soelian, waaxda luqadaha, qaybta kaalmada adeegyada, chel aiken . So [facility] [phone].    ATENCIÓN: Si habla español, tiene a ferro disposición servicios gratuitos de asistencia lingüística. Llame al [phone].    We comply with applicable federal civil rights laws and [place] laws. We do not discriminate on the basis of race, color, national origin, age, disability, sex, sexual orientation, or gender identity.            Thank you!     Thank you for choosing [facility]  for your care. Our goal is always to provide you with excellent care. Hearing back from our patients is one way we can continue to improve our services. Please take a few minutes to complete the written survey that you may receive in the mail after your visit with us. Thank you!             Your Updated Medication List - Protect others around you: Learn how to safely use, store and throw away your medicines at www.disposemymeds.org.          This list is accurate as of 8/30/18 12:01 PM.  Always use your most recent med list.                   Brand Name Dispense Instructions for use Diagnosis "    albuterol 108 (90 Base) MCG/ACT inhaler    PROAIR HFA/PROVENTIL HFA/VENTOLIN HFA    1 Inhaler    Inhale 1-2 puffs into the lungs every 6 hours as needed for shortness of breath / dyspnea or wheezing    Acute bronchospasm       APPLE CIDER VINEGAR PO           FISH OIL           ipratropium 0.03 % spray    ATROVENT    1 Box    Spray 2 sprays into both nostrils every 12 hours    Chronic rhinitis       ONE-A-DAY MENS PO           pantoprazole 40 MG EC tablet    PROTONIX    90 tablet    Take 1 tablet (40 mg) by mouth daily as needed for gastroesophageal reflux disease.  Take 30-60 minutes before a meal.    Gastroesophageal reflux disease, esophagitis presence not specified       tadalafil 20 MG tablet    CIALIS    6 tablet    He can 1/2-1 tablet up to once a day as needed before sex.  Never use with nitroglycerin, terazosin or doxazosin.    Erectile dysfunction due to arterial insufficiency       TURMERIC PO

## 2018-08-30 NOTE — NURSING NOTE
"Vinnie Duran is a 84 year old male who presents for:  Chief Complaint   Patient presents with     Neurologic Problem     referral from Dr. Farrar for left low b ack pain with left sciatica        Vitals:    There were no vitals filed for this visit.    BMI:  Estimated body mass index is 28.19 kg/(m^2) as calculated from the following:    Height as of 7/19/18: 5' 8.11\" (1.73 m).    Weight as of 8/20/18: 186 lb (84.4 kg).    Pain Score:  Data Unavailable        Giovanna Yanez, DANIELE, AAS      "

## 2018-08-30 NOTE — PATIENT INSTRUCTIONS
-Schedule lumbar MRI  -We will call you with results  -Please contact the clinic if pain persists at [phone].

## 2018-09-06 ENCOUNTER — TELEPHONE (OUTPATIENT)
Dept: NEUROSURGERY | Facility: CLINIC | Age: 83
End: 2018-09-06

## 2018-09-06 DIAGNOSIS — M51.369 LUMBAR DEGENERATIVE DISC DISEASE: Primary | ICD-10-CM

## 2018-09-06 NOTE — TELEPHONE ENCOUNTER
TC to patient to review lumbar MRI.  Previous lumbar XR noted possible old fracture, MRI did not reveal new/acute fracture.  Patient states low back pain significantly improved, and LLE pain essentially gone.  We discussed getting flex/ext lumbar XR due to anterolisthesis at L2 on L3 to view stability of spine and patient agreeable.  He would like us to contact him with XR results.  If pain increases he will contact us, consider PT/Injecitons at that time.

## 2018-10-10 ENCOUNTER — ALLIED HEALTH/NURSE VISIT (OUTPATIENT)
Dept: NURSING | Facility: CLINIC | Age: 83
End: 2018-10-10
Payer: COMMERCIAL

## 2018-10-10 DIAGNOSIS — Z23 NEED FOR PROPHYLACTIC VACCINATION AND INOCULATION AGAINST INFLUENZA: Primary | ICD-10-CM

## 2018-10-10 PROCEDURE — 99207 ZZC NO CHARGE NURSE ONLY: CPT

## 2018-10-10 PROCEDURE — G0008 ADMIN INFLUENZA VIRUS VAC: HCPCS

## 2018-10-10 PROCEDURE — 90662 IIV NO PRSV INCREASED AG IM: CPT

## 2018-10-10 NOTE — PROGRESS NOTES
Injectable Influenza Immunization Documentation    1.  Is the person to be vaccinated sick today?   No    2. Does the person to be vaccinated have an allergy to a component   of the vaccine?   No  Egg Allergy Algorithm Link    3. Has the person to be vaccinated ever had a serious reaction   to influenza vaccine in the past?   No    4. Has the person to be vaccinated ever had Guillain-Barré syndrome?   No    Form completed by Patient  Yaneli Garsia CMA   VIS for influenza given on same date of administration.  Staff signature/Title: Yaneli Garsia CMA   Due to injection administration, patient instructed to remain in clinic for 15 minutes  afterwards, and to report any adverse reaction to me immediately.  Yaneli Garsia CMA

## 2018-10-10 NOTE — MR AVS SNAPSHOT
After Visit Summary   10/10/2018    Vinnie Duran    MRN: [id number]           Patient Information     Date Of Birth          10/12/1933        Visit Information        Provider Department      10/10/2018 10:50 AM CARE COORDINATOR CP [facility]        Today's Diagnoses     Need for prophylactic vaccination and inoculation against influenza    -  1       Follow-ups after your visit        Who to contact     If you have questions or need follow up information about today's clinic visit or your schedule please contact [facility] directly at [phone].  Normal or non-critical lab and imaging results will be communicated to you by MyChart, letter or phone within 4 business days after the clinic has received the results. If you do not hear from us within 7 days, please contact the clinic through MyChart or phone. If you have a critical or abnormal lab result, we will notify you by phone as soon as possible.  Submit refill requests through [employer] or call your pharmacy and they will forward the refill request to us. Please allow 3 business days for your refill to be completed.          Additional Information About Your Visit        Care EveryWhere ID     This is your Care EveryWhere ID. This could be used by other organizations to access your [facility] medical records  [id number]         Blood Pressure from Last 3 Encounters:   08/30/18 130/79   08/20/18 129/79   07/19/18 112/68    Weight from Last 3 Encounters:   08/30/18 186 lb (84.4 kg)   08/20/18 186 lb (84.4 kg)   07/19/18 186 lb (84.4 kg)              We Performed the Following     ADMIN INFLUENZA (For MEDICARE Patients ONLY) []     FLU VACCINE, INCREASED ANTIGEN, PRESV FREE, AGE 65+ [81557]        Primary Care Provider Office Phone # Fax #    aNvdeep Pabon [phone]       [address] [facility] [address]        Equal Access to Services     ROCHELLE NERI: Shane  ilda Brumfield, wawandada luqadaha, qaybta kaalcarlton pollock, chel polina osmelanna dickersonyony andresjb laOneiltemitope tomas. So [facility] [phone].    ATENCIÓN: Si habla español, tiene a ferro disposición servicios gratuitos de asistencia lingüística. Jana al [phone].    We comply with applicable federal civil rights laws and [place] laws. We do not discriminate on the basis of race, color, national origin, age, disability, sex, sexual orientation, or gender identity.            Thank you!     Thank you for choosing [facility]  for your care. Our goal is always to provide you with excellent care. Hearing back from our patients is one way we can continue to improve our services. Please take a few minutes to complete the written survey that you may receive in the mail after your visit with us. Thank you!             Your Updated Medication List - Protect others around you: Learn how to safely use, store and throw away your medicines at www.disposemymeds.org.          This list is accurate as of 10/10/18 11:04 AM.  Always use your most recent med list.                   Brand Name Dispense Instructions for use Diagnosis    albuterol 108 (90 Base) MCG/ACT inhaler    PROAIR HFA/PROVENTIL HFA/VENTOLIN HFA    1 Inhaler    Inhale 1-2 puffs into the lungs every 6 hours as needed for shortness of breath / dyspnea or wheezing    Acute bronchospasm       APPLE CIDER VINEGAR PO           FISH OIL           ipratropium 0.03 % spray    ATROVENT    1 Box    Spray 2 sprays into both nostrils every 12 hours    Chronic rhinitis       ONE-A-DAY MENS PO           pantoprazole 40 MG EC tablet    PROTONIX    90 tablet    Take 1 tablet (40 mg) by mouth daily as needed for gastroesophageal reflux disease.  Take 30-60 minutes before a meal.    Gastroesophageal reflux disease, esophagitis presence not specified       tadalafil 20 MG tablet    CIALIS    6 tablet    He can 1/2-1 tablet up to once a day as needed before sex.  Never  use with nitroglycerin, terazosin or doxazosin.    Erectile dysfunction due to arterial insufficiency       TURMERIC PO

## 2018-10-15 ENCOUNTER — OFFICE VISIT (OUTPATIENT)
Dept: FAMILY MEDICINE | Facility: CLINIC | Age: 83
End: 2018-10-15
Payer: COMMERCIAL

## 2018-10-15 VITALS
OXYGEN SATURATION: 95 % | TEMPERATURE: 97.1 F | WEIGHT: 191 LBS | HEART RATE: 74 BPM | SYSTOLIC BLOOD PRESSURE: 134 MMHG | BODY MASS INDEX: 28.21 KG/M2 | DIASTOLIC BLOOD PRESSURE: 80 MMHG

## 2018-10-15 DIAGNOSIS — D72.829 LEUKOCYTOSIS, UNSPECIFIED TYPE: Primary | ICD-10-CM

## 2018-10-15 DIAGNOSIS — D69.6 THROMBOCYTOPENIA (H): ICD-10-CM

## 2018-10-15 DIAGNOSIS — D72.820 LYMPHOCYTOSIS: ICD-10-CM

## 2018-10-15 LAB
DIFFERENTIAL METHOD BLD: ABNORMAL
EOSINOPHIL # BLD AUTO: 0.2 10E9/L (ref 0–0.7)
EOSINOPHIL NFR BLD AUTO: 1 %
ERYTHROCYTE [DISTWIDTH] IN BLOOD BY AUTOMATED COUNT: 13.6 % (ref 10–15)
HCT VFR BLD AUTO: 44 % (ref 40–53)
HGB BLD-MCNC: 14.6 G/DL
LYMPHOCYTES # BLD AUTO: 11.1 10E9/L (ref 0.8–5.3)
LYMPHOCYTES NFR BLD AUTO: 67 %
MCH RBC QN AUTO: 32.2 PG (ref 26.5–33)
MCHC RBC AUTO-ENTMCNC: 33.2 G/DL (ref 31.5–36.5)
MCV RBC AUTO: 97 FL (ref 78–100)
MONOCYTES # BLD AUTO: 0.8 10E9/L (ref 0–1.3)
MONOCYTES NFR BLD AUTO: 5 %
NEUTROPHILS # BLD AUTO: 4.5 10E9/L (ref 1.6–8.3)
NEUTROPHILS NFR BLD AUTO: 27 %
PLATELET # BLD AUTO: 141 10E9/L (ref 150–450)
PLATELET # BLD EST: ABNORMAL 10*3/UL
RBC # BLD AUTO: 4.53 10E12/L (ref 4.4–5.9)
RBC MORPH BLD: ABNORMAL
VARIANT LYMPHS BLD QL SMEAR: PRESENT
WBC # BLD AUTO: 16.6 10E9/L (ref 4–11)

## 2018-10-15 PROCEDURE — 99213 OFFICE O/P EST LOW 20 MIN: CPT | Performed by: FAMILY MEDICINE

## 2018-10-15 PROCEDURE — 85025 COMPLETE CBC W/AUTO DIFF WBC: CPT | Performed by: FAMILY MEDICINE

## 2018-10-15 PROCEDURE — 36415 COLL VENOUS BLD VENIPUNCTURE: CPT | Performed by: FAMILY MEDICINE

## 2018-10-15 NOTE — PROGRESS NOTES
SUBJECTIVE:   Vinnie Duran is a 85 year old male who presents to clinic today for the following health issues:      Patient is here to go over lab results.    I had seen him in July and August and we did lab work on him.  His white blood cell count was elevated in the 19-20,000 range with a predominance of lymphocytes.  He was having left low back and leg pain at that time which was of greater priority for him.  He was seen by neurosurgery and had a lumbar MRI and had some treatment and now that is doing a lot better.  He is using an inversion table at home and is not having any more sciatica.  He is not having any unexplained weight loss or night sweats or unusual fatigue.  He generally feels well now.    Problem list and histories reviewed & adjusted, as indicated.  Additional history: as documented    Patient Active Problem List   Diagnosis     Erectile dysfunction     Hyperlipidemia with target LDL less than 130     Advanced directives, counseling/discussion     Chronic rhinitis     Benign non-nodular prostatic hyperplasia with lower urinary tract symptoms     Thrombocytopenia (H)     Actinic keratoses     Gastroesophageal reflux disease, esophagitis presence not specified     History of melanoma     Urinary retention with incomplete bladder emptying     Impaired fasting glucose     Past Surgical History:   Procedure Laterality Date     APPENDECTOMY       ARTHROSCOPY KNEE      right     CATARACT IOL, RT/LT  2/11    right     CATARACT IOL, RT/LT  2/13    left     HERNIA REPAIR  1989    bilat inguinal     TONSILLECTOMY       TURP  2008 and 5/18/17       Social History   Substance Use Topics     Smoking status: Former Smoker     Smokeless tobacco: Never Used      Comment: high school     Alcohol use No     History reviewed. No pertinent family history.      Current Outpatient Prescriptions   Medication Sig Dispense Refill     albuterol (PROAIR HFA/PROVENTIL HFA/VENTOLIN HFA) 108 (90 Base) MCG/ACT Inhaler  Inhale 1-2 puffs into the lungs every 6 hours as needed for shortness of breath / dyspnea or wheezing 1 Inhaler 0     APPLE CIDER VINEGAR PO        FISH OIL        ipratropium (ATROVENT) 0.03 % spray Spray 2 sprays into both nostrils every 12 hours 1 Box 5     Multiple Vitamin (ONE-A-DAY MENS PO)        pantoprazole (PROTONIX) 40 MG EC tablet Take 1 tablet (40 mg) by mouth daily as needed for gastroesophageal reflux disease.  Take 30-60 minutes before a meal. 90 tablet 1     tadalafil (CIALIS) 20 MG tablet He can 1/2-1 tablet up to once a day as needed before sex.  Never use with nitroglycerin, terazosin or doxazosin. 6 tablet 5     No Known Allergies    Reviewed and updated as needed this visit by clinical staff  Tobacco  Allergies  Meds  Med Hx  Surg Hx  Fam Hx  Soc Hx      Reviewed and updated as needed this visit by Provider         ROS:  Constitutional, HEENT, cardiovascular, pulmonary, gi and gu systems are negative, except as otherwise noted.    OBJECTIVE:     /80 (BP Location: Right arm, Patient Position: Chair, Cuff Size: Adult Regular)  Pulse 74  Temp 97.1  F (36.2  C) (Oral)  Wt 191 lb (86.6 kg)  SpO2 95%  BMI 28.21 kg/m2  Body mass index is 28.21 kg/(m^2).  GENERAL: healthy, alert and no distress    Diagnostic Test Results:  Office Visit on 10/15/2018   Component Date Value Ref Range Status     WBC 10/15/2018 16.6* 4.0 - 11.0 10e9/L Final     RBC Count 10/15/2018 4.53  4.4 - 5.9 10e12/L Final     Hemoglobin 10/15/2018 14.6  g/dL Final     Hematocrit 10/15/2018 44.0  40.0 - 53.0 % Final     MCV 10/15/2018 97  78 - 100 fl Final     MCH 10/15/2018 32.2  26.5 - 33.0 pg Final     MCHC 10/15/2018 33.2  31.5 - 36.5 g/dL Final     RDW 10/15/2018 13.6  10.0 - 15.0 % Final     Platelet Count 10/15/2018 141* 150 - 450 10e9/L Final     Diff Method 10/15/2018 PENDING   Incomplete         ASSESSMENT/PLAN:       ICD-10-CM    1. Leukocytosis, unspecified type D72.829 CBC with platelets and differential      ONC/HEME ADULT REFERRAL   2. Lymphocytosis D72.820    3. Thrombocytopenia (H) D69.6      His white blood cell count is still elevated, although not quite as high as a few months ago            Platelets are slightly low as well  He still has a predominance of lymphocytes  I am suspicious that he may have CLL or something similar  I would like to have him consult with hematology/oncology and he was agreeable to that, so I made a referral for him to see [facility] for further evaluation of this    Navdeep Pabon MD  [facility]

## 2018-10-15 NOTE — MR AVS SNAPSHOT
After Visit Summary   10/15/2018    Vinnie Duran    MRN: [id number]           Patient Information     Date Of Birth          10/12/1933        Visit Information        Provider Department      10/15/2018 11:20 AM Navdeep Pabon MD [facility]        Today's Diagnoses     Leukocytosis, unspecified type    -  1       Follow-ups after your visit        Additional Services     ONC/HEME ADULT REFERRAL       Your provider has referred you to: N: [place] [facility] [phone]   [email or web address]    Please be aware that coverage of these services is subject to the terms and limitations of your health insurance plan.  Call member services at your health plan with any benefit or coverage questions.      Please bring the following with you to your appointment:    (1) Any X-Rays, CTs or MRIs which have been performed.  Contact the facility where they were done to arrange for  prior to your scheduled appointment.   (2) List of current medications  (3) This referral request   (4) Any documents/labs given to you for this referral                  Follow-up notes from your care team     Return if symptoms worsen or fail to improve.      Your next 10 appointments already scheduled     Oct 15, 2018 11:20 AM CDT   SHORT with Navdeep Pabon MD   [facility] ([facility])    [address] [place] [id number] [phone]              Who to contact     If you have questions or need follow up information about today's clinic visit or your schedule please contact [facility] directly at [phone].  Normal or non-critical lab and imaging results will be communicated to you by MyChart, letter or phone within 4 business days after the clinic has received the results. If you do not hear from us within 7 days, please contact the  clinic through [employer] or phone. If you have a critical or abnormal lab result, we will notify you by phone as soon as possible.  Submit refill requests through [employer] or call your pharmacy and they will forward the refill request to us. Please allow 3 business days for your refill to be completed.          Additional Information About Your Visit        Care EveryWhere ID     This is your Care EveryWhere ID. This could be used by other organizations to access your [facility] medical records  [id number]        Your Vitals Were     Pulse Temperature Pulse Oximetry BMI (Body Mass Index)          74 97.1  F (36.2  C) (Oral) 95% 28.21 kg/m2         Blood Pressure from Last 3 Encounters:   10/15/18 134/80   08/30/18 130/79   08/20/18 129/79    Weight from Last 3 Encounters:   10/15/18 191 lb (86.6 kg)   08/30/18 186 lb (84.4 kg)   08/20/18 186 lb (84.4 kg)              We Performed the Following     CBC with platelets and differential     ONC/HEME ADULT REFERRAL        Primary Care Provider Office Phone # Fax #    Navdeep Pabon [phone]       [address] [facility] [address]        Equal Access to Services     ROCHELLE INFANTE : Hadii ilda mengo Somarilynali, waaxda luqadaha, qaybta kaalmada adeegyada, chel marin. So [facility] [phone].    ATENCIÓN: Si habla español, tiene a ferro disposición servicios gratuitos de asistencia lingüística. Llame al [phone].    We comply with applicable federal civil rights laws and [place] laws. We do not discriminate on the basis of race, color, national origin, age, disability, sex, sexual orientation, or gender identity.            Thank you!     Thank you for choosing [facility]  for your care. Our goal is always to provide you with excellent care. Hearing back from our patients is one way we can continue to improve our services. Please take a few minutes to complete the written survey that you  may receive in the mail after your visit with us. Thank you!             Your Updated Medication List - Protect others around you: Learn how to safely use, store and throw away your medicines at www.disposemymeds.org.          This list is accurate as of 10/15/18 11:11 AM.  Always use your most recent med list.                   Brand Name Dispense Instructions for use Diagnosis    albuterol 108 (90 Base) MCG/ACT inhaler    PROAIR HFA/PROVENTIL HFA/VENTOLIN HFA    1 Inhaler    Inhale 1-2 puffs into the lungs every 6 hours as needed for shortness of breath / dyspnea or wheezing    Acute bronchospasm       APPLE CIDER VINEGAR PO           FISH OIL           ipratropium 0.03 % spray    ATROVENT    1 Box    Spray 2 sprays into both nostrils every 12 hours    Chronic rhinitis       ONE-A-DAY MENS PO           pantoprazole 40 MG EC tablet    PROTONIX    90 tablet    Take 1 tablet (40 mg) by mouth daily as needed for gastroesophageal reflux disease.  Take 30-60 minutes before a meal.    Gastroesophageal reflux disease, esophagitis presence not specified       tadalafil 20 MG tablet    CIALIS    6 tablet    He can 1/2-1 tablet up to once a day as needed before sex.  Never use with nitroglycerin, terazosin or doxazosin.    Erectile dysfunction due to arterial insufficiency

## 2018-10-26 ENCOUNTER — TRANSFERRED RECORDS (OUTPATIENT)
Dept: HEALTH INFORMATION MANAGEMENT | Facility: CLINIC | Age: 83
End: 2018-10-26

## 2018-10-29 PROBLEM — C91.10 CLL (CHRONIC LYMPHOCYTIC LEUKEMIA) (H): Status: ACTIVE | Noted: 2018-07-01

## 2018-12-03 ENCOUNTER — TRANSFERRED RECORDS (OUTPATIENT)
Dept: HEALTH INFORMATION MANAGEMENT | Facility: CLINIC | Age: 83
End: 2018-12-03

## 2019-01-18 ENCOUNTER — TELEPHONE (OUTPATIENT)
Dept: FAMILY MEDICINE | Facility: CLINIC | Age: 84
End: 2019-01-18

## 2019-01-18 DIAGNOSIS — J31.0 CHRONIC RHINITIS: ICD-10-CM

## 2019-01-18 RX ORDER — IPRATROPIUM BROMIDE 21 UG/1
2 SPRAY, METERED NASAL EVERY 12 HOURS
Qty: 30 ML | Refills: 5 | Status: SHIPPED | OUTPATIENT
Start: 2019-01-18 | End: 2019-08-20

## 2019-01-22 DIAGNOSIS — N52.01 ERECTILE DYSFUNCTION DUE TO ARTERIAL INSUFFICIENCY: ICD-10-CM

## 2019-01-22 RX ORDER — TADALAFIL 20 MG/1
TABLET ORAL
Qty: 6 TABLET | Refills: 5 | Status: SHIPPED | OUTPATIENT
Start: 2019-01-22 | End: 2019-06-20

## 2019-01-22 NOTE — TELEPHONE ENCOUNTER
"Requested Prescriptions   Pending Prescriptions Disp Refills     tadalafil (CIALIS) 20 MG tablet 6 tablet 5     Sig: He can 1/2-1 tablet up to once a day as needed before sex.  Never use with nitroglycerin, terazosin or doxazosin.    Erectile Dysfuction Protocol Passed - 1/22/2019 11:18 AM       Passed - Absence of nitrates on medication list       Passed - Absence of Alpha Blockers on Med list       Passed - Recent (12 mo) or future (30 days) visit within the authorizing provider's specialty    Patient had office visit in the last 12 months or has a visit in the next 30 days with authorizing provider or within the authorizing provider's specialty.  See \"Patient Info\" tab in inbasket, or \"Choose Columns\" in Meds & Orders section of the refill encounter.             Passed - Medication is active on med list       Passed - Patient is age 18 or older        Prescription approved per [facility] Refill Protocol.  Shirlene Patrick RN      "

## 2019-01-22 NOTE — TELEPHONE ENCOUNTER
Reason for Call:  Medication or medication refill:    Do you use a [facility]?  Name of the pharmacy and phone number for the current request:  [id number] - [place], [place] - [address]    Name of the medication requested: tadalafil (CIALIS) 20 MG tablet        Can we leave a detailed message on this number? YES    Phone number patient can be reached at: Home number on file [phone] (home)    Best Time: Anytime    Call taken on 1/22/2019 at 11:15 AM by Aubree Singh

## 2019-01-23 ENCOUNTER — TELEPHONE (OUTPATIENT)
Dept: FAMILY MEDICINE | Facility: CLINIC | Age: 84
End: 2019-01-23

## 2019-01-23 DIAGNOSIS — N52.01 ERECTILE DYSFUNCTION DUE TO ARTERIAL INSUFFICIENCY: Primary | ICD-10-CM

## 2019-01-23 RX ORDER — SILDENAFIL CITRATE 20 MG/1
TABLET ORAL
Qty: 20 TABLET | Refills: 5 | Status: SHIPPED | OUTPATIENT
Start: 2019-01-23 | End: 2019-08-20

## 2019-01-23 NOTE — TELEPHONE ENCOUNTER
Received faxed request from [facility]. Ph. [phone] Fax [phone]    Medication: Tadalafil 20mg tablet    $374 not covered. Sildenafil 20mg is cheaper. Would this be ok for pt?

## 2019-02-11 ENCOUNTER — TELEPHONE (OUTPATIENT)
Dept: FAMILY MEDICINE | Facility: CLINIC | Age: 84
End: 2019-02-11

## 2019-02-11 NOTE — TELEPHONE ENCOUNTER
Patient was last see 10/15/18.  Referred to [facility] for blood abnormality.  I do see records scanned into Epic from [facility], diagnoses with CLL.    He has history of prostate cancer and urinary issues.  I don't see any UA/UC done since 8/21/17, UC did not grow anything.    I called number listed, wife answered and she says Vinnie is not having any issues; she is the one who called.     I found her chart in the system and started a new call.    Closing this one.    Rosa Murphy RN  [facility]

## 2019-02-11 NOTE — TELEPHONE ENCOUNTER
"Reason for call:  Patient reporting a symptom    Symptom or request: urinary frequency \"feel like things are just not right\"    Duration (how long have symptoms been present): 1 day    Have you been treated for this before? Yes    Additional comments: patient states she would like to be treated for UTI     Phone Number patient can be reached at:  Home number on file [phone] (home)    Best Time:  any      Can we leave a detailed message on this number:  YES    Call taken on 2/11/2019 at 10:27 AM by Kristy Vanessa    "

## 2019-02-19 ENCOUNTER — OFFICE VISIT (OUTPATIENT)
Dept: FAMILY MEDICINE | Facility: CLINIC | Age: 84
End: 2019-02-19
Payer: COMMERCIAL

## 2019-02-19 VITALS
DIASTOLIC BLOOD PRESSURE: 77 MMHG | SYSTOLIC BLOOD PRESSURE: 133 MMHG | HEART RATE: 73 BPM | WEIGHT: 191 LBS | OXYGEN SATURATION: 97 % | BODY MASS INDEX: 28.21 KG/M2 | TEMPERATURE: 97.8 F

## 2019-02-19 DIAGNOSIS — H10.33 ACUTE CONJUNCTIVITIS OF BOTH EYES, UNSPECIFIED ACUTE CONJUNCTIVITIS TYPE: Primary | ICD-10-CM

## 2019-02-19 DIAGNOSIS — Z85.6 HISTORY OF CHRONIC LYMPHOCYTIC LEUKEMIA: ICD-10-CM

## 2019-02-19 DIAGNOSIS — J01.90 ACUTE SINUSITIS WITH COEXISTING CONDITION REQUIRING PROPHYLACTIC TREATMENT: ICD-10-CM

## 2019-02-19 PROCEDURE — 99214 OFFICE O/P EST MOD 30 MIN: CPT | Performed by: PHYSICIAN ASSISTANT

## 2019-02-19 RX ORDER — AMOXICILLIN 500 MG/1
500 CAPSULE ORAL 2 TIMES DAILY
Qty: 20 CAPSULE | Refills: 0 | Status: SHIPPED | OUTPATIENT
Start: 2019-02-19 | End: 2019-08-20

## 2019-02-19 RX ORDER — POLYMYXIN B SULFATE AND TRIMETHOPRIM 1; 10000 MG/ML; [USP'U]/ML
1-2 SOLUTION OPHTHALMIC EVERY 6 HOURS
Qty: 4 ML | Refills: 0 | Status: SHIPPED | OUTPATIENT
Start: 2019-02-19 | End: 2019-08-20

## 2019-02-19 NOTE — PATIENT INSTRUCTIONS
Patient Education     What Is Conjunctivitis?    Conjunctivitis is an irritation or infection. It affects the membrane that covers the white of your eye and the inside of your eyelid (conjunctiva). It can happen to one or both eyes. The membrane swells and the blood vessels enlarge (dilate). This makes your eye red. That's why conjunctivitis is sometimes called red eye or pink eye.  What are the symptoms?  If you have one or more of these symptoms, see an eye healthcare provider:    Redness in and around your eye    Eyes that are puffy and sore    Itching, burning, or stinging eyes    Watery eyes or discharge from your eye    Eyelids that are crusty or stuck together when you wake up in the morning    Pink color in the whites of one or both eyes    Sensitivity to bright light  Getting treatment quickly can help prevent damage to your eyes.  How is it diagnosed?  Conjunctivitis is usually a minor eye infection. But it can sometimes become a more serious problem. Some more serious eye diseases have symptoms that look like conjunctivitis. So it's important for an eye healthcare provider to diagnose you. Your eye healthcare provider will ask about your symptoms and any medicines you take. He or she will ask about any illnesses or medical conditions you may have. The healthcare provider will also check your eyes with a hand-held light and a special microscope called a slit lamp.  Date Last Reviewed: 10/1/2017    2234-3638 [employer]. [address] [place] [address], [place] [address]. All rights reserved. This information is not intended as a substitute for professional medical care. Always follow your healthcare professional's instructions.

## 2019-02-19 NOTE — PROGRESS NOTES
SUBJECTIVE:   Vinnie Duran is a 85 year old male who presents to clinic today for the following health issues:      RESPIRATORY SYMPTOMS      Duration: X 3 days    Description  nasal congestion, facial pain/pressure and cough    Severity: moderate    Accompanying signs and symptoms: both eyes - redness, swelling, watering, yellow matter, thee. In the AM    History (predisposing factors):  none    Precipitating or alleviating factors: None    Therapies tried and outcome:  Cheratussin, inhaler  Recent yearly eye exam within the last month.  No cataracts or glaucoma.  Today has no decreased vision.  No fever.  No asthma.  History of chronic lymphocytic leukemia.    No Known Allergies    Past Medical History:   Diagnosis Date     Actinic keratoses 7/19/2018     BPH (benign prostatic hypertrophy)      CLL (chronic lymphocytic leukemia) (H) 07/2018    followed by Dr. Michela Andres from [facility]     Erectile dysfunction      GERD (gastroesophageal reflux disease)      Hyperlipidemia LDL goal < 130      Impaired fasting glucose 07/2018     Melanoma (H) 2/03    superficial spreading         Current Outpatient Medications on File Prior to Visit:  albuterol (PROAIR HFA/PROVENTIL HFA/VENTOLIN HFA) 108 (90 Base) MCG/ACT Inhaler Inhale 1-2 puffs into the lungs every 6 hours as needed for shortness of breath / dyspnea or wheezing   APPLE CIDER VINEGAR PO    FISH OIL    ipratropium (ATROVENT) 0.03 % nasal spray Spray 2 sprays into both nostrils every 12 hours   Multiple Vitamin (ONE-A-DAY MENS PO)    pantoprazole (PROTONIX) 40 MG EC tablet Take 1 tablet (40 mg) by mouth daily as needed for gastroesophageal reflux disease.  Take 30-60 minutes before a meal.   sildenafil (REVATIO) 20 MG tablet Take 2-4 pills up to once daily as needed for erectile dysfunction   tadalafil (CIALIS) 20 MG tablet He can 1/2-1 tablet up to once a day as needed before sex.  Never use with nitroglycerin, terazosin or doxazosin.     No current  facility-administered medications on file prior to visit.     Social History     Tobacco Use     Smoking status: Former Smoker     Smokeless tobacco: Never Used     Tobacco comment: high school   Substance Use Topics     Alcohol use: No       ROS:  CONSTITUTIONAL: Negative for fatigue or fever.  EYES: Negative for eye problems.  ENT: As above.  RESP: As above.  CV: Negative for chest pains.  GI: Negative for vomiting.  MUSCULOSKELETAL:  Negative for significant muscle or joint pains.  NEUROLOGIC: Negative for headaches.  SKIN: Negative for rash.    OBJECTIVE:  /77   Pulse 73   Temp 97.8  F (36.6  C) (Oral)   Wt 86.6 kg (191 lb)   SpO2 97%   BMI 28.21 kg/m    GENERAL APPEARANCE: Healthy, alert and no distress.  EYES:Conjunctiva/sclera with moderate injection.  Yellow exudate on the lashes.  EOMs intact without pain.  Pupils equal reactive to light and accommodation.    EARS: No cerumen.   Ear canals w/o erythema.  TM's intact w/o erythema.    NOSE/MOUTH: Nasal turbinates red and inflamed   SINUSES: No maxillary sinus tenderness.  THROAT: Mild erythema w/o tonsillar enlargement . Post nasal drip noted.  NECK: Supple, nontender, no lymphadenopathy.  RESP: Lungs clear to auscultation - no rales, rhonchi or wheezes  CV: Regular rate and rhythm, normal S1 S2, no murmur noted.  NEURO: Awake, alert    SKIN: No rashes      ASSESSMENT:     ICD-10-CM    1. Acute conjunctivitis of both eyes, unspecified acute conjunctivitis type H10.33 trimethoprim-polymyxin b (POLYTRIM) 03178-4.1 UNIT/ML-% ophthalmic solution     amoxicillin (AMOXIL) 500 MG capsule   2. Acute sinusitis with coexisting condition requiring prophylactic treatment J01.90 trimethoprim-polymyxin b (POLYTRIM) 28467-0.1 UNIT/ML-% ophthalmic solution     amoxicillin (AMOXIL) 500 MG capsule   3. History of chronic lymphocytic leukemia Z85.6            PLAN: Warm compresses for exudate.  Cool compresses as needed for comfort.  Recheck ice 3 days if not  improving.  Lots of rest and fluids.  RTC if any worsening symptoms or if not improving.    Alesha Rogers PA-C

## 2019-04-02 ENCOUNTER — TRANSFERRED RECORDS (OUTPATIENT)
Dept: HEALTH INFORMATION MANAGEMENT | Facility: CLINIC | Age: 84
End: 2019-04-02

## 2019-05-30 ENCOUNTER — OFFICE VISIT (OUTPATIENT)
Dept: FAMILY MEDICINE | Facility: CLINIC | Age: 84
End: 2019-05-30
Payer: COMMERCIAL

## 2019-05-30 VITALS
BODY MASS INDEX: 28.64 KG/M2 | HEART RATE: 78 BPM | TEMPERATURE: 97.4 F | HEIGHT: 68 IN | DIASTOLIC BLOOD PRESSURE: 84 MMHG | WEIGHT: 189 LBS | OXYGEN SATURATION: 95 % | SYSTOLIC BLOOD PRESSURE: 139 MMHG

## 2019-05-30 DIAGNOSIS — M77.8 SHOULDER TENDINITIS, RIGHT: Primary | ICD-10-CM

## 2019-05-30 DIAGNOSIS — M25.511 ACUTE PAIN OF RIGHT SHOULDER: ICD-10-CM

## 2019-05-30 PROCEDURE — 99213 OFFICE O/P EST LOW 20 MIN: CPT | Performed by: FAMILY MEDICINE

## 2019-05-30 RX ORDER — NAPROXEN 250 MG/1
250 TABLET ORAL 2 TIMES DAILY WITH MEALS
COMMUNITY
Start: 2019-05-30 | End: 2019-08-20

## 2019-05-30 ASSESSMENT — PAIN SCALES - GENERAL: PAINLEVEL: NO PAIN (0)

## 2019-05-30 ASSESSMENT — MIFFLIN-ST. JEOR: SCORE: 1510.74

## 2019-05-30 NOTE — PROGRESS NOTES
SUBJECTIVE:   Vinnie Duran is a 85 year old male who presents to clinic today for the following   health issues:  Patient comes in today with right shoulder stiffness pain for a few months.  He reports for the past week or so has been getting better he denies any injury or trauma.  He has no falls.  He denies not remember any recent injury.  He has no numbness in his finger no weakness in his shoulder no weakness in his hand.    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  He reports this motion sometimes causing more stiffness to his shoulder.    Denies any neck pain denies any loss of strength or sensation has no numbness in his finger or hand.    Joint Pain    Onset: A couple of months    Description:   Location: right shoulder  Character: Stabbing; not presently, but during certain movements    Intensity: moderate    Progression of Symptoms: worse, intermittent    Accompanying Signs & Symptoms:  Other symptoms: none    History:   Previous similar pain: no       Precipitating factors:   Trauma or overuse: YES, drives bus    Alleviating factors:  Improved by: nothing    Therapies Tried and outcome: Aspirin or Advil; slightly     Additional history: as documented    Reviewed  and updated as needed this visit by clinical staff         Reviewed and updated as needed this visit by Provider        ROS: 10 point ROS neg other than the symptoms noted above in the HPI.    Patient Active Problem List   Diagnosis     Erectile dysfunction     Hyperlipidemia with target LDL less than 130     Advanced directives, counseling/discussion     Chronic rhinitis     Benign non-nodular prostatic hyperplasia with lower urinary tract symptoms     Thrombocytopenia (H)     Actinic keratoses     Gastroesophageal reflux disease, esophagitis presence not specified     History of melanoma     Urinary retention with incomplete bladder emptying     Impaired fasting glucose  "    CLL (chronic lymphocytic leukemia) (H)     Past Surgical History:   Procedure Laterality Date     APPENDECTOMY       ARTHROSCOPY KNEE      right     CATARACT IOL, RT/LT  2/11    right     CATARACT IOL, RT/LT  2/13    left     HERNIA REPAIR  1989    bilat inguinal     TONSILLECTOMY       TURP  2008 and 5/18/17       Social History     Tobacco Use     Smoking status: Former Smoker     Smokeless tobacco: Never Used     Tobacco comment: high school   Substance Use Topics     Alcohol use: No     History reviewed. No pertinent family history.      Current Outpatient Medications   Medication Sig Dispense Refill     APPLE CIDER VINEGAR PO        FISH OIL        Multiple Vitamin (ONE-A-DAY MENS PO)        naproxen (NAPROSYN) 250 MG tablet Take 1 tablet (250 mg) by mouth 2 times daily (with meals)       pantoprazole (PROTONIX) 40 MG EC tablet Take 1 tablet (40 mg) by mouth daily as needed for gastroesophageal reflux disease.  Take 30-60 minutes before a meal. 90 tablet 1     sildenafil (REVATIO) 20 MG tablet Take 2-4 pills up to once daily as needed for erectile dysfunction 20 tablet 5     tadalafil (CIALIS) 20 MG tablet He can 1/2-1 tablet up to once a day as needed before sex.  Never use with nitroglycerin, terazosin or doxazosin. 6 tablet 5     albuterol (PROAIR HFA/PROVENTIL HFA/VENTOLIN HFA) 108 (90 Base) MCG/ACT Inhaler Inhale 1-2 puffs into the lungs every 6 hours as needed for shortness of breath / dyspnea or wheezing (Patient not taking: Reported on 5/30/2019) 1 Inhaler 0     ipratropium (ATROVENT) 0.03 % nasal spray Spray 2 sprays into both nostrils every 12 hours (Patient not taking: Reported on 5/30/2019) 30 mL 5     No Known Allergies   Vital signs:  Temp: 97.4  F (36.3  C) Temp src: Oral BP: 139/84 Pulse: 78     SpO2: 95 %     Height: 171.8 cm (5' 7.62\") Weight: 85.7 kg (189 lb)  Estimated body mass index is 29.06 kg/m  as calculated from the following:    Height as of this encounter: 1.718 m (5' 7.62\").    " Weight as of this encounter: 85.7 kg (189 lb).    Right Shoulder exam: FROM in all planes,  Rotator cuff/supraspinatous 5/5 strength.   Mild stiffness at AC joint, otherwise, normal exam  Assessment & Plan       ICD-10-CM    1. Shoulder tendinitis, right M75.81 naproxen (NAPROSYN) 250 MG tablet   2. Acute pain of right shoulder M25.511 naproxen (NAPROSYN) 250 MG tablet     Patient was advised with home exercise, he was advised he may take a low dose of naproxen twice a day with food for the next  2  weeks.  In addition, applying ice to his shoulder.  Follow up with PCP as needed              No follow-ups on file.    Cyndi Mar MD  [facility]

## 2019-06-18 DIAGNOSIS — N52.01 ERECTILE DYSFUNCTION DUE TO ARTERIAL INSUFFICIENCY: ICD-10-CM

## 2019-06-18 NOTE — TELEPHONE ENCOUNTER
"Requested Prescriptions   Pending Prescriptions Disp Refills     tadalafil (CIALIS) 20 MG tablet 6 tablet 5     Sig: He can 1/2-1 tablet up to once a day as needed before sex.  Never use with nitroglycerin, terazosin or doxazosin.   Last Written Prescription Date:  1/22/19  Last Fill Quantity: 6,  # refills: 5   Last office visit: 5/30/2019 with prescribing provider:     Future Office Visit:        Erectile Dysfuction Protocol Passed - 6/18/2019  9:25 AM        Passed - Absence of nitrates on medication list        Passed - Absence of Alpha Blockers on Med list        Passed - Recent (12 mo) or future (30 days) visit within the authorizing provider's specialty     Patient had office visit in the last 12 months or has a visit in the next 30 days with authorizing provider or within the authorizing provider's specialty.  See \"Patient Info\" tab in inbasket, or \"Choose Columns\" in Meds & Orders section of the refill encounter.              Passed - Medication is active on med list        Passed - Patient is age 18 or older          "

## 2019-06-18 NOTE — TELEPHONE ENCOUNTER
Reason for Call:  Medication or medication refill:    Do you use a [facility]?  Name of the pharmacy and phone number for the current request:  [id number] - [place], [place] - [address]    Name of the medication requested: tadalafil (CIALIS) 20 MG tablet    Other request:     Can we leave a detailed message on this number? YES    Phone number patient can be reached at: Home number on file [phone] (home)    Best Time: anytime    Call taken on 6/18/2019 at 9:07 AM by Lexy Chavez

## 2019-06-20 RX ORDER — TADALAFIL 20 MG/1
TABLET ORAL
Qty: 6 TABLET | Refills: 5 | Status: SHIPPED | OUTPATIENT
Start: 2019-06-20 | End: 2019-08-20

## 2019-06-21 NOTE — TELEPHONE ENCOUNTER
Prescription approved per [facility] Refill Protocol.    Rosa Murphy RN  [facility]

## 2019-08-20 ENCOUNTER — OFFICE VISIT (OUTPATIENT)
Dept: FAMILY MEDICINE | Facility: CLINIC | Age: 84
End: 2019-08-20
Payer: COMMERCIAL

## 2019-08-20 ENCOUNTER — DOCUMENTATION ONLY (OUTPATIENT)
Dept: FAMILY MEDICINE | Facility: CLINIC | Age: 84
End: 2019-08-20

## 2019-08-20 VITALS
BODY MASS INDEX: 28.29 KG/M2 | HEART RATE: 62 BPM | WEIGHT: 184 LBS | OXYGEN SATURATION: 95 % | SYSTOLIC BLOOD PRESSURE: 121 MMHG | DIASTOLIC BLOOD PRESSURE: 75 MMHG | TEMPERATURE: 98.4 F

## 2019-08-20 DIAGNOSIS — E78.5 HYPERLIPIDEMIA LDL GOAL <100: ICD-10-CM

## 2019-08-20 DIAGNOSIS — D69.6 THROMBOCYTOPENIA (H): ICD-10-CM

## 2019-08-20 DIAGNOSIS — Z00.00 ROUTINE GENERAL MEDICAL EXAMINATION AT A HEALTH CARE FACILITY: Primary | ICD-10-CM

## 2019-08-20 DIAGNOSIS — R73.01 IMPAIRED FASTING GLUCOSE: ICD-10-CM

## 2019-08-20 DIAGNOSIS — R33.9 URINARY RETENTION WITH INCOMPLETE BLADDER EMPTYING: ICD-10-CM

## 2019-08-20 DIAGNOSIS — N52.01 ERECTILE DYSFUNCTION DUE TO ARTERIAL INSUFFICIENCY: ICD-10-CM

## 2019-08-20 DIAGNOSIS — L98.9 SKIN LESION OF BACK: ICD-10-CM

## 2019-08-20 DIAGNOSIS — N18.30 CKD (CHRONIC KIDNEY DISEASE) STAGE 3, GFR 30-59 ML/MIN (H): ICD-10-CM

## 2019-08-20 DIAGNOSIS — C91.10 CLL (CHRONIC LYMPHOCYTIC LEUKEMIA) (H): ICD-10-CM

## 2019-08-20 DIAGNOSIS — L57.0 ACTINIC KERATOSES: ICD-10-CM

## 2019-08-20 DIAGNOSIS — Z85.820 HISTORY OF MELANOMA: ICD-10-CM

## 2019-08-20 LAB
ALBUMIN UR-MCNC: NORMAL MG/DL
ALT SERPL W P-5'-P-CCNC: 17 U/L (ref 0–70)
ANION GAP SERPL CALCULATED.3IONS-SCNC: 6 MMOL/L (ref 3–14)
APPEARANCE UR: NORMAL
AST SERPL W P-5'-P-CCNC: 18 U/L (ref 0–45)
BACTERIA #/AREA URNS HPF: NORMAL /HPF
BILIRUB UR QL STRIP: NORMAL
BUN SERPL-MCNC: 22 MG/DL (ref 7–30)
CALCIUM SERPL-MCNC: 9.1 MG/DL (ref 8.5–10.1)
CHLORIDE SERPL-SCNC: 112 MMOL/L (ref 94–109)
CHOLEST SERPL-MCNC: 155 MG/DL
CO2 SERPL-SCNC: 26 MMOL/L (ref 20–32)
COLOR UR AUTO: NORMAL
CREAT SERPL-MCNC: 1.17 MG/DL (ref 0.66–1.25)
CREAT UR-MCNC: 93 MG/DL
GFR SERPL CREATININE-BSD FRML MDRD: 56 ML/MIN/{1.73_M2}
GLUCOSE SERPL-MCNC: 97 MG/DL (ref 70–99)
GLUCOSE UR STRIP-MCNC: NORMAL MG/DL
HBA1C MFR BLD: 5.2 % (ref 0–5.6)
HDLC SERPL-MCNC: 34 MG/DL
HGB UR QL STRIP: NORMAL
KETONES UR STRIP-MCNC: NORMAL MG/DL
LDLC SERPL CALC-MCNC: 104 MG/DL
LEUKOCYTE ESTERASE UR QL STRIP: NORMAL
MICROALBUMIN UR-MCNC: 39 MG/L
MICROALBUMIN/CREAT UR: 42 MG/G CR (ref 0–17)
NITRATE UR QL: NORMAL
NON-SQ EPI CELLS #/AREA URNS LPF: NORMAL /LPF
NONHDLC SERPL-MCNC: 121 MG/DL
PH UR STRIP: NORMAL PH (ref 5–7)
POTASSIUM SERPL-SCNC: 4.3 MMOL/L (ref 3.4–5.3)
RBC #/AREA URNS AUTO: NORMAL /HPF (ref 0–2)
SODIUM SERPL-SCNC: 144 MMOL/L (ref 133–144)
SOURCE: NORMAL
SP GR UR STRIP: NORMAL (ref 1–1.03)
TRIGL SERPL-MCNC: 84 MG/DL
UROBILINOGEN UR STRIP-ACNC: NORMAL EU/DL (ref 0.2–1)
WBC #/AREA URNS AUTO: NORMAL /HPF
WBC CLUMPS #/AREA URNS HPF: NORMAL /HPF

## 2019-08-20 PROCEDURE — 11104 PUNCH BX SKIN SINGLE LESION: CPT | Performed by: FAMILY MEDICINE

## 2019-08-20 PROCEDURE — 36415 COLL VENOUS BLD VENIPUNCTURE: CPT | Performed by: FAMILY MEDICINE

## 2019-08-20 PROCEDURE — 80061 LIPID PANEL: CPT | Performed by: FAMILY MEDICINE

## 2019-08-20 PROCEDURE — 99000 SPECIMEN HANDLING OFFICE-LAB: CPT | Performed by: FAMILY MEDICINE

## 2019-08-20 PROCEDURE — 99397 PER PM REEVAL EST PAT 65+ YR: CPT | Mod: 25 | Performed by: FAMILY MEDICINE

## 2019-08-20 PROCEDURE — 17000 DESTRUCT PREMALG LESION: CPT | Mod: 59 | Performed by: FAMILY MEDICINE

## 2019-08-20 PROCEDURE — 83036 HEMOGLOBIN GLYCOSYLATED A1C: CPT | Performed by: FAMILY MEDICINE

## 2019-08-20 PROCEDURE — 84450 TRANSFERASE (AST) (SGOT): CPT | Performed by: FAMILY MEDICINE

## 2019-08-20 PROCEDURE — 17003 DESTRUCT PREMALG LES 2-14: CPT | Mod: 59 | Performed by: FAMILY MEDICINE

## 2019-08-20 PROCEDURE — 80048 BASIC METABOLIC PNL TOTAL CA: CPT | Performed by: FAMILY MEDICINE

## 2019-08-20 PROCEDURE — 84460 ALANINE AMINO (ALT) (SGPT): CPT | Performed by: FAMILY MEDICINE

## 2019-08-20 PROCEDURE — 88305 TISSUE EXAM BY PATHOLOGIST: CPT | Performed by: FAMILY MEDICINE

## 2019-08-20 PROCEDURE — 82043 UR ALBUMIN QUANTITATIVE: CPT | Performed by: FAMILY MEDICINE

## 2019-08-20 PROCEDURE — 99213 OFFICE O/P EST LOW 20 MIN: CPT | Mod: 25 | Performed by: FAMILY MEDICINE

## 2019-08-20 ASSESSMENT — ENCOUNTER SYMPTOMS
DIARRHEA: 0
HEMATURIA: 0
FEVER: 0
CONSTIPATION: 0
FREQUENCY: 0
NERVOUS/ANXIOUS: 0
EYE PAIN: 0
ABDOMINAL PAIN: 0
HEMATOCHEZIA: 0
COUGH: 0
CHILLS: 0
DIZZINESS: 0

## 2019-08-20 ASSESSMENT — ACTIVITIES OF DAILY LIVING (ADL): CURRENT_FUNCTION: NO ASSISTANCE NEEDED

## 2019-08-20 NOTE — PROGRESS NOTES
"SUBJECTIVE:   Vinnie Duran is a 85 year old male who presents for a Preventive Visit and follow-up on baseline health conditions.  Are you in the first 12 months of your Medicare coverage?  No    Healthy Habits:     In general, how would you rate your overall health?  Good    Frequency of exercise:  None    Do you usually eat at least 4 servings of fruit and vegetables a day, include whole grains    & fiber and avoid regularly eating high fat or \"junk\" foods?  Yes    Taking medications regularly:  Yes    Medication side effects:  None    Ability to successfully perform activities of daily living:  No assistance needed    Home Safety:  Lack of grab bars in the bathroom and no safety concerns identified    Hearing Impairment:  No hearing concerns    In the past 6 months, have you been bothered by leaking of urine?  No    In general, how would you rate your overall mental or emotional health?  Good      PHQ-2 Total Score: 0    Additional concerns today:  No    Do you feel safe in your environment? Yes    Do you have a Health Care Directive? No: Advance care planning reviewed with patient; information given to patient to review.      Fall risk  Fallen 2 or more times in the past year?: No  Any fall with injury in the past year?: No    Cognitive Screening   1) Repeat 3 items (Leader, Season, Table)    2) Clock draw: NORMAL  3) 3 item recall: Recalls 2 objects   Results: NORMAL clock, 1-2 items recalled: COGNITIVE IMPAIRMENT LESS LIKELY    Mini-CogTM Copyright JACK Aparicio. Licensed by the author for use in [facility]; reprinted with permission (bryce@.[email or web address]). All rights reserved.      Do you have sleep apnea, excessive snoring or daytime drowsiness?: no    Reviewed and updated as needed this visit by clinical staff  Tobacco  Allergies  Meds  Med Hx  Surg Hx  Fam Hx  Soc Hx        Reviewed and updated as needed this visit by Provider        Social History     Tobacco Use     Smoking status: Former " Smoker     Smokeless tobacco: Never Used     Tobacco comment: high school   Substance Use Topics     Alcohol use: No         Alcohol Use 8/20/2019   Prescreen: >3 drinks/day or >7 drinks/week? Not Applicable   Prescreen: >3 drinks/day or >7 drinks/week? -     He is seeing Dr. Andres of [facility] every 6 months or so for his CLL and thrombocytopenia.   His labs are just being followed for now.  He is generally feeling well.  He does still self catheterize every couple of days or so when he feels some lower abdominal pressure.  He will usually get about 300 to 500 cc of urine out.  He is able to urinate on his own at other times.  He does have erectile dysfunction.  He has tried various PDE 5 inhibitors, but they have not been very helpful for him.  He is on no routine prescription medications currently.  He is generally feeling well.      Current providers sharing in care for this patient include:   Patient Care Team:  Navdeep Pabon MD as PCP - General  Nadveep Pabon MD as Assigned PCP    The following health maintenance items are reviewed in Epic and correct as of today:  Health Maintenance   Topic Date Due     ZOSTER IMMUNIZATION (1 of 2) 10/12/1983     PHQ-2  01/01/2019     MEDICARE ANNUAL WELLNESS VISIT  07/19/2019     FALL RISK ASSESSMENT  07/19/2019     INFLUENZA VACCINE (1) 09/01/2019     ADVANCE CARE PLANNING  07/19/2023     DTAP/TDAP/TD IMMUNIZATION (3 - Td) 11/05/2025     IPV IMMUNIZATION  Aged Out     MENINGITIS IMMUNIZATION  Aged Out     Patient Active Problem List   Diagnosis     Erectile dysfunction     Hyperlipidemia LDL goal <100     Advanced directives, counseling/discussion     Chronic rhinitis     Benign non-nodular prostatic hyperplasia with lower urinary tract symptoms     Thrombocytopenia (H)     Actinic keratoses     Gastroesophageal reflux disease, esophagitis presence not specified     History of melanoma     Urinary retention with incomplete bladder emptying     Impaired  "fasting glucose     CLL (chronic lymphocytic leukemia) (H)     CKD (chronic kidney disease) stage 3, GFR 30-59 ml/min (H)     Past Surgical History:   Procedure Laterality Date     APPENDECTOMY       ARTHROSCOPY KNEE      right     CATARACT IOL, RT/LT  2/11    right     CATARACT IOL, RT/LT  2/13    left     HERNIA REPAIR  1989    bilat inguinal     TONSILLECTOMY       TURP  2008 and 5/18/17       Social History     Tobacco Use     Smoking status: Former Smoker     Smokeless tobacco: Never Used     Tobacco comment: high school   Substance Use Topics     Alcohol use: No     History reviewed. No pertinent family history.      Current Outpatient Medications   Medication Sig Dispense Refill     APPLE CIDER VINEGAR PO Take by mouth daily        FISH OIL daily        Multiple Vitamin (ONE-A-DAY MENS PO) Take by mouth daily        No Known Allergies      Review of Systems   Constitutional: Negative for chills and fever.   HENT: Negative for congestion and ear pain.    Eyes: Negative for pain.   Respiratory: Negative for cough.    Cardiovascular: Negative for chest pain.   Gastrointestinal: Negative for abdominal pain, constipation, diarrhea and hematochezia.   Genitourinary: Negative for frequency and hematuria.   Neurological: Negative for dizziness.   Psychiatric/Behavioral: The patient is not nervous/anxious.          OBJECTIVE:   /75 (BP Location: Right arm, Patient Position: Sitting, Cuff Size: Adult Regular)   Pulse 62   Temp 98.4  F (36.9  C) (Oral)   Wt 83.5 kg (184 lb)   SpO2 95%   BMI 28.29 kg/m   Estimated body mass index is 29.06 kg/m  as calculated from the following:    Height as of 5/30/19: 1.718 m (5' 7.62\").    Weight as of 5/30/19: 85.7 kg (189 lb).  Physical Exam  GENERAL: healthy, alert and no distress  EYES: Eyes grossly normal to inspection, PERRL and conjunctivae and sclerae normal  HENT: ear canals and TM's normal, nose and mouth without ulcers or lesions  NECK: no adenopathy, no " asymmetry, masses, or scars and thyroid normal to palpation  RESP: lungs clear to auscultation - no rales, rhonchi or wheezes  CV: regular rate and rhythm, normal S1 S2, no S3 or S4, no murmur, click or rub, no peripheral edema and peripheral pulses strong  ABDOMEN: soft, nontender, no hepatosplenomegaly, no masses   MS: no gross musculoskeletal defects noted, no edema  SKIN: He has an erythematous scaly area on the upper right forehead, another one on the left upper forehead, and another on the right lateral cheek.  These are consistent with actinic keratoses.  They were frozen with liquid nitrogen.  He has numerous scattered seborrheic keratoses over his trunk.  He does have an erythematous macular area on his upper back measuring about 1-1/4 cm wide by half centimeter in height.  It looks somewhat suspicious for a basal cell or squamous cell carcinoma.  The skin lesion was therefore cleansed, anesthetized, and then biopsied with a 4 mm punch.  NEURO: Normal strength and tone, mentation intact and speech normal  PSYCH: mentation appears normal, affect normal/bright    Diagnostic Test Results:  Labs reviewed in Epic    ASSESSMENT / PLAN:       ICD-10-CM    1. Routine general medical examination at a health care facility Z00.00    2. CLL (chronic lymphocytic leukemia) (H) C91.90    3. Thrombocytopenia (H) D69.6    4. CKD (chronic kidney disease) stage 3, GFR 30-59 ml/min (H) N18.3 UA reflex to Microscopic and Culture   5. Impaired fasting glucose R73.01 Basic metabolic panel     Hemoglobin A1c   6. Hyperlipidemia LDL goal <100 E78.5 Lipid panel reflex to direct LDL Fasting     ALT     AST   7. Actinic keratoses L57.0 DESTRUCT PREMALIGNANT LESION, FIRST     DESTRUCT PREMALIGNANT LESION, 2-14   8. Skin lesion of back L98.9    9. History of melanoma Z85.820    10. Urinary retention with incomplete bladder emptying R33.9    11. Erectile dysfunction due to arterial insufficiency N52.01      Blood pressure and other vital  "signs look acceptable  We discussed the above items  We will check fasting labs today as above  He will continue to get routine CBC's and other labs done through Dr. Andres for his CLL and thrombocytopenia  We will check pathology results from his upper back biopsy and proceed to an excision of the skin lesion if indicated  Continue self cath program  Discussed other options for treatment of erectile dysfunction, but he declined  Plan a tentative recheck in 1 year, or sooner prn    End of Life Planning:  Patient currently has an advanced directive: No.  I have verified the patient's ablity to prepare an advanced directive/make health care decisions.  Literature was provided to assist patient in preparing an advanced directive.    COUNSELING:  Reviewed preventive health counseling, as reflected in patient instructions       Regular exercise       Healthy diet/nutrition    Estimated body mass index is 29.06 kg/m  as calculated from the following:    Height as of 5/30/19: 1.718 m (5' 7.62\").    Weight as of 5/30/19: 85.7 kg (189 lb).         reports that he has quit smoking. He has never used smokeless tobacco.      Appropriate preventive services were discussed with this patient, including applicable screening as appropriate for cardiovascular disease, diabetes, osteopenia/osteoporosis, and glaucoma.  As appropriate for age/gender, discussed screening for colorectal cancer, prostate cancer, breast cancer, and cervical cancer. Checklist reviewing preventive services available has been given to the patient.    Reviewed patients plan of care and provided an AVS. The Basic Care Plan (routine screening as documented in Health Maintenance) for Vinnie meets the Care Plan requirement. This Care Plan has been established and reviewed with the Patient.    Counseling Resources:  ATP IV Guidelines  Pooled Cohorts Equation Calculator  Breast Cancer Risk Calculator  FRAX Risk Assessment  ICSI Preventive Guidelines  Dietary " Guidelines for Americans, 2010  USDA's MyPlate  ASA Prophylaxis  Lung CA Screening    Navdeep Pabon MD  [facility]    Identified Health Risks:

## 2019-08-20 NOTE — LETTER
[facility]  [address] [place]  [address]  [phone]        August 21, 2019    Vinnie Duran  [address]   [place] [address]        Dear Vinnie,    Your lab work continues to look good and/or stable from the past including cholesterol values, liver and kidney tests, and diabetes testing.  I will let you know your skin biopsy results when they become available.    Results for orders placed or performed in visit on 08/20/19   Basic metabolic panel   Result Value Ref Range    Sodium 144 133 - 144 mmol/L    Potassium 4.3 3.4 - 5.3 mmol/L    Chloride 112 (H) 94 - 109 mmol/L    Carbon Dioxide 26 20 - 32 mmol/L    Anion Gap 6 3 - 14 mmol/L    Glucose 97 70 - 99 mg/dL    Urea Nitrogen 22 7 - 30 mg/dL    Creatinine 1.17 0.66 - 1.25 mg/dL    GFR Estimate 56 (L) >60 mL/min/[1.73_m2]    GFR Estimate If Black 65 >60 mL/min/[1.73_m2]    Calcium 9.1 8.5 - 10.1 mg/dL   Hemoglobin A1c   Result Value Ref Range    Hemoglobin A1C 5.2 0 - 5.6 %   Lipid panel reflex to direct LDL Fasting   Result Value Ref Range    Cholesterol 155 <200 mg/dL    Triglycerides 84 <150 mg/dL    HDL Cholesterol 34 (L) >39 mg/dL    LDL Cholesterol Calculated 104 (H) <100 mg/dL    Non HDL Cholesterol 121 <130 mg/dL   ALT   Result Value Ref Range    ALT 17 0 - 70 U/L   AST   Result Value Ref Range    AST 18 0 - 45 U/L   UA reflex to Microscopic and Culture   Result Value Ref Range    Color Urine Canceled, Test credited     Appearance Urine Canceled, Test credited     Glucose Urine Canceled, Test credited NEG^Negative mg/dL    Bilirubin Urine Canceled, Test credited NEG^Negative    Ketones Urine Canceled, Test credited NEG^Negative mg/dL    Specific Gravity Urine Canceled, Test credited 1.003 - 1.035    Blood Urine Canceled, Test credited NEG^Negative    pH Urine Canceled, Test credited 5.0 - 7.0 pH    Protein Albumin Urine Canceled, Test credited NEG^Negative mg/dL    Urobilinogen Urine Canceled, Test  credited 0.2 - 1.0 EU/dL    Nitrite Urine Canceled, Test credited NEG^Negative    Leukocyte Esterase Urine Canceled, Test credited NEG^Negative    Source Canceled, Test credited    Urine Microscopic   Result Value Ref Range    WBC Urine Canceled, Test credited OTO5^0 - 5 /HPF    RBC Urine Canceled, Test credited 0 - 2 /HPF    WBC Clumps Canceled, Test credited NEG^Negative /HPF    Squamous Epithelial /LPF Urine Canceled, Test credited FEW^Few /LPF    Bacteria Urine Canceled, Test credited NEG^Negative /HPF   Albumin Random Urine Quantitative with Creat Ratio   Result Value Ref Range    Creatinine Urine 93 mg/dL    Albumin Urine mg/L 39 mg/L    Albumin Urine mg/g Cr 42.00 (H) 0 - 17 mg/g Cr       If you have any questions please call the clinic at [phone].    Sincerely,    Navdeep LACEY

## 2019-08-21 NOTE — RESULT ENCOUNTER NOTE
Vinnie,  Your lab work continues to look good and/or stable from the past including cholesterol values, liver and kidney tests, and diabetes testing.  I will let you know your skin biopsy results when they become available.    Esteban Pabon

## 2019-08-23 LAB — COPATH REPORT: NORMAL

## 2019-08-26 ENCOUNTER — TELEPHONE (OUTPATIENT)
Dept: FAMILY MEDICINE | Facility: CLINIC | Age: 84
End: 2019-08-26

## 2019-08-26 NOTE — RESULT ENCOUNTER NOTE
RN -- please call Vinnie and inform him that his skin biopsy did show a basal cell skin cancer.  Reassure him that this is the most common and least serious type of skin cancer, but it should be excised.  Please help him schedule a 40-minute appointment with me sometime in the upcoming weeks to do that.    Navdeep Pabon MD

## 2019-08-26 NOTE — TELEPHONE ENCOUNTER
Attempted to reach patient and requested call back on voicemail to RN line.    Viviane Hernandez RN

## 2019-08-26 NOTE — TELEPHONE ENCOUNTER
Received the following message from Dr Pabon:    RN -- please call Vinnie and inform him that his skin biopsy did show a basal cell skin cancer.  Reassure him that this is the most common and least serious type of skin cancer, but it should be excised.  Please help him schedule a 40-minute appointment with me sometime in the upcoming weeks to do that.

## 2019-08-26 NOTE — TELEPHONE ENCOUNTER
Patient called back. I scheduled him for 9/5/19 for a 40 minute appointment.    Viviane Hernandez RN

## 2019-09-05 ENCOUNTER — OFFICE VISIT (OUTPATIENT)
Dept: FAMILY MEDICINE | Facility: CLINIC | Age: 84
End: 2019-09-05
Payer: COMMERCIAL

## 2019-09-05 VITALS
SYSTOLIC BLOOD PRESSURE: 129 MMHG | TEMPERATURE: 98.6 F | WEIGHT: 185 LBS | OXYGEN SATURATION: 95 % | HEART RATE: 76 BPM | BODY MASS INDEX: 28.45 KG/M2 | DIASTOLIC BLOOD PRESSURE: 72 MMHG

## 2019-09-05 DIAGNOSIS — N52.01 ERECTILE DYSFUNCTION DUE TO ARTERIAL INSUFFICIENCY: ICD-10-CM

## 2019-09-05 DIAGNOSIS — C44.519 BCC (BASAL CELL CARCINOMA), BACK: Primary | ICD-10-CM

## 2019-09-05 PROCEDURE — 88305 TISSUE EXAM BY PATHOLOGIST: CPT | Performed by: FAMILY MEDICINE

## 2019-09-05 PROCEDURE — 99000 SPECIMEN HANDLING OFFICE-LAB: CPT | Performed by: FAMILY MEDICINE

## 2019-09-05 PROCEDURE — 11604 EXC TR-EXT MAL+MARG 3.1-4 CM: CPT | Performed by: FAMILY MEDICINE

## 2019-09-05 PROCEDURE — 12032 INTMD RPR S/A/T/EXT 2.6-7.5: CPT | Performed by: FAMILY MEDICINE

## 2019-09-05 PROCEDURE — 99212 OFFICE O/P EST SF 10 MIN: CPT | Mod: 25 | Performed by: FAMILY MEDICINE

## 2019-09-05 NOTE — LETTER
"[facility]  [address] [place]  [address]  [phone]        September 10, 2019    Vinnie Washington  [address]   [facility] [address]        Dear Vinnie,    This pathology report confirms that all of the skin cancer was removed from your upper back.  No further treatment should be required for it.  I hope the site is healing up well for you.  Please return this coming Monday for the suture removal as scheduled.    Results for orders placed or performed in visit on 09/05/19   Surgical pathology exam   Result Value Ref Range    Copath Report       Patient Name: VINNIE WASHINGTON  MR#: [id number]  Specimen #: [id number]  Collected: 9/5/2019  Received: 9/6/2019  Reported: 9/9/2019 18:49  Ordering Phy(s): DONA LARA    For improved result formatting, select 'View Enhanced Report Format' under   Linked Documents section.    SPECIMEN(S):  Skin, lesion upper mid back    FINAL DIAGNOSIS:  Skin ellipse, upper mid back, excision:  - Superficial basal cell carcinoma, without extension to margins.  - Evidence of recent biopsy site.    Electronically signed out by:    Luis Daniel Villalta M.D.    CLINICAL HISTORY:  85 year old male.  Excision of basal cell carcinoma from upper mid back   diagnosed by punch biopsy on  8/20/2019.    GROSS:  The specimen is received in formalin with the patient's name and proper   identification labeled \"upper mid back  skin lesion \".  The specimen consists of tan unoriented skin ellipse (3.0   x 1.4 x 0.4 cm).  On the skin  surface there is a hypopigmented poorly demarcated rough-surfaced area   measuring 1.5 x  1.3 cm with a central  scab measuring 0.3 x 0.3 cm.  Half of the specimen is inked blue and the   opposite half is inked black.  The  specimen is serially sectioned and entirely submitted in three cassettes.    Summary of Sections:  A1 - tips  A2 - cross-sections of half of the specimen  A3 - cross-sections of opposite half of the specimens " (Dictated by:   Rocky Hunt 9/6/2019 10:01 AM)    MICROSCOPIC:  Microscopic examination is performed.    The technical component of this testing was completed at the [facility], with the professional component performed   at the [facility], [address] [place] [id number] ([phone])    CPT Codes:  A: 57721-SL3    COLLECTION SITE:  Client: [facility]  Location: [facility] (B)         If you have any questions please call the clinic at [phone].    Sincerely,    Navdeep LACEY

## 2019-09-05 NOTE — PROGRESS NOTES
Subjective     Vinnie Duran is a 85 year old male who presents to clinic today for the following health issues:    HPI   Basal cell skin cancer removal on his back.    He was in for a routine physical a couple of weeks ago and had a skin lesion identified on his upper back that look suspicious for skin cancer.  It was biopsied and found to be a basal cell skin cancer.  He previously has had melanoma in the past.    He did have some further questions about his erectile dysfunction and various treatments for that today.    Patient Active Problem List   Diagnosis     Erectile dysfunction     Hyperlipidemia LDL goal <100     Advanced directives, counseling/discussion     Chronic rhinitis     Benign non-nodular prostatic hyperplasia with lower urinary tract symptoms     Thrombocytopenia (H)     Actinic keratoses     Gastroesophageal reflux disease, esophagitis presence not specified     History of melanoma     Urinary retention with incomplete bladder emptying     Impaired fasting glucose     CLL (chronic lymphocytic leukemia) (H)     CKD (chronic kidney disease) stage 3, GFR 30-59 ml/min (H)     Past Surgical History:   Procedure Laterality Date     APPENDECTOMY       ARTHROSCOPY KNEE      right     CATARACT IOL, RT/LT  2/11    right     CATARACT IOL, RT/LT  2/13    left     HERNIA REPAIR  1989    bilat inguinal     TONSILLECTOMY       TURP  2008 and 5/18/17       Social History     Tobacco Use     Smoking status: Former Smoker     Smokeless tobacco: Never Used     Tobacco comment: high school   Substance Use Topics     Alcohol use: No     History reviewed. No pertinent family history.      Current Outpatient Medications   Medication Sig Dispense Refill     APPLE CIDER VINEGAR PO Take by mouth daily        FISH OIL daily        Multiple Vitamin (ONE-A-DAY MENS PO) Take by mouth daily        No Known Allergies      Reviewed and updated as needed this visit by Provider         Review of Systems   ROS COMP:  Constitutional, HEENT, cardiovascular, pulmonary, gi and gu systems are negative, except as otherwise noted.      Objective    /72 (BP Location: Right arm, Patient Position: Sitting, Cuff Size: Adult Regular)   Pulse 76   Temp 98.6  F (37  C) (Oral)   Wt 83.9 kg (185 lb)   SpO2 95%   BMI 28.45 kg/m    Body mass index is 28.45 kg/m .  Physical Exam   GENERAL: healthy, alert and no distress  SKIN: He has a macular erythematous skin lesion on his upper mid back.  It measures about a centimeter in diameter in height and has a couple of separate foci measuring about 2 cm or so in width.    After discussion with the patient, including risks and benefits of the procedure, we elected to proceed with the removal.  The lesion was cleansed with alcohol and then anesthetized with 1% lidocaine with epinephrine.  An elliptical excision was made around the lesion with roughly 2-3 mm borders in its width so that the total width of the excision was approximately 1.6 cm  and the length was roughly 3.2 cm.   The wound was then closed with 3 4-0 vicryl subcuticular sutures in a layered closure followed by 7  4-0 nylon sutures through the skin.  Bacitracin and a sterile dressing were then applied.  The removed specimen was sent to pathology.      Diagnostic Test Results:                     Value:Patient Name: NEEL WASHINGTON  MR#: [id number]  Specimen #: [id number]  Collected: 8/20/2019  Received: 8/22/2019  Reported: 8/23/2019 16:45  Ordering Phy(s): DONA LARA    For improved result formatting, select 'View Enhanced Report Format' under   Linked Documents section.    SPECIMEN(S):  Skin, upper back    FINAL DIAGNOSIS:  Skin lesion from upper back:  - Multicentric basal cell carcinoma, indeterminate margins of resection in   punch biopsy.    Electronically signed out by:    MARTHA Cordero M.D.    CLINICAL HISTORY:  85-year-old male with erythematous skin lesion on upper back.  Rule out   BCC or SCC.    GROSS:  The  "specimen is received in formalin with proper patient identification   labeled \"upper back skin biopsy\".  The  specimen consists of tan rough-surfaced skin punch biopsy (0.4 x 0.4 x 0.3   cm).  The specimen is inked blue  and bisected.  The specimen is entirely submitted in one cassette.   (Dictated by: Rocky Hunt 8/22/2019  01:40 PM)    MICROSCOPIC:  The sec                          tions show portions of skin with a area of basal cell carcinoma   arising from the base of the epithelium  with small nests showing a multicentric pattern.  Areas of chronic   inflammation are associated as well as  reactive keratosis.  Although tumor is not identified at the margins, the   nonoriented nature of the specimen  precludes full margin evaluation. (Dictated by: LYNETTE Cordero MD   08/23/2019)    The technical component of this testing was completed at the [facility], with the professional component performed   at the [facility], [address] [place] [phone] ([phone])    CPT Codes:  A: 77592-LF7    COLLECTION SITE:  Client: [facility]  Location: FP (B)               Assessment & Plan       ICD-10-CM    1. BCC (basal cell carcinoma), back C44.519 HANDLING CHARGE TO REF LAB     Surgical pathology exam     EXC MALIG SKIN LESION TRUNK/ARM/LEG 1.1-2.0 CM     REPAIR INTERMED, WOUND TRUNK/ARM/LEG 2.6-7.5 CM   2. Erectile dysfunction due to arterial insufficiency N52.01         BMI:   Estimated body mass index is 28.45 kg/m  as calculated from the following:    Height as of 5/30/19: 1.718 m (5' 7.62\").    Weight as of this encounter: 83.9 kg (185 lb).     Routine wound care for the excision site  Discussed various options for treatment of erectile dysfunction beyond the PDE 5 inhibitors, but he declines pursuing them at this time    Return in about 11 " days (around 9/16/2019) for suture removal.    Navdeep Pabon MD  [facility]

## 2019-09-09 LAB — COPATH REPORT: NORMAL

## 2019-09-10 NOTE — RESULT ENCOUNTER NOTE
Vinnie,  This pathology report confirms that all of the skin cancer was removed from your upper back.  No further treatment should be required for it.  I hope the site is healing up well for you.  Please return this coming Monday for the suture removal as scheduled.    Navdeep Pabon MD

## 2019-09-16 ENCOUNTER — ALLIED HEALTH/NURSE VISIT (OUTPATIENT)
Dept: NURSING | Facility: CLINIC | Age: 84
End: 2019-09-16
Payer: COMMERCIAL

## 2019-09-16 DIAGNOSIS — Z48.02 ENCOUNTER FOR REMOVAL OF SUTURES: Primary | ICD-10-CM

## 2019-09-16 PROCEDURE — 99207 ZZC NO CHARGE NURSE ONLY: CPT

## 2019-09-16 NOTE — PROGRESS NOTES
Vinnie Duran presents to the clinic today for removal of sutures.  The patient has had the sutures in place for 11 days.  There has been no history of infection or drainage.  7 sutures are seen located on the mid upper back.  The wound is healing well with no signs of infection.  Tetanus status is up to date.   All sutures were easily removed today.  Routine wound care discussed.  The patient will follow up as needed.    Rosa Murphy RN  [facility]

## 2019-10-28 ENCOUNTER — ALLIED HEALTH/NURSE VISIT (OUTPATIENT)
Dept: NURSING | Facility: CLINIC | Age: 84
End: 2019-10-28
Payer: COMMERCIAL

## 2019-10-28 DIAGNOSIS — Z23 NEED FOR PROPHYLACTIC VACCINATION AND INOCULATION AGAINST INFLUENZA: Primary | ICD-10-CM

## 2019-10-28 PROCEDURE — 99207 ZZC NO CHARGE NURSE ONLY: CPT

## 2019-10-28 PROCEDURE — 90662 IIV NO PRSV INCREASED AG IM: CPT

## 2019-10-28 PROCEDURE — G0008 ADMIN INFLUENZA VIRUS VAC: HCPCS

## 2020-01-07 ENCOUNTER — TELEPHONE (OUTPATIENT)
Dept: FAMILY MEDICINE | Facility: CLINIC | Age: 85
End: 2020-01-07

## 2020-01-07 DIAGNOSIS — H10.33 ACUTE CONJUNCTIVITIS OF BOTH EYES, UNSPECIFIED ACUTE CONJUNCTIVITIS TYPE: Primary | ICD-10-CM

## 2020-01-07 RX ORDER — POLYMYXIN B SULFATE AND TRIMETHOPRIM 1; 10000 MG/ML; [USP'U]/ML
1-2 SOLUTION OPHTHALMIC 4 TIMES DAILY
Qty: 1 BOTTLE | Refills: 0 | Status: SHIPPED | OUTPATIENT
Start: 2020-01-07 | End: 2020-03-16

## 2020-01-07 NOTE — TELEPHONE ENCOUNTER
Reason for call:  Patient reporting a symptom    Symptom or request:  Possible pink eye, both eyes are red and crusty when he wakes up. Patient said all clinics are full so was wondering if could get a medication.    Duration (how long have symptoms been present):  About 3 days    Have you been treated for this before? Yes    Additional comments:     Phone Number patient can be reached at:  Home number on file [phone] (home)    Best Time:  any    Can we leave a detailed message on this number:  YES    Call taken on 1/7/2020 at 11:08 AM by Lesly Lucero

## 2020-01-07 NOTE — TELEPHONE ENCOUNTER
See message below    Symptom or request:  Possible pink eye, both eyes are red and crusty when he wakes up. Patient said all clinics are full so was wondering if could get a medication.     Duration (how long have symptoms been present):  About 3 days     Have you been treated for this before? Yes      PCP to advise. Pharmacy cued, if needed.      Jackelyn Schaefer RN on 1/7/2020 at 11:28 AM

## 2020-01-07 NOTE — TELEPHONE ENCOUNTER
Relayed MD message to patient. Patient verbalizes understanding and states there are no further questions.      Jackelyn Schaefer RN on 1/7/2020 at 11:41 AM

## 2020-01-16 ENCOUNTER — TELEPHONE (OUTPATIENT)
Dept: FAMILY MEDICINE | Facility: CLINIC | Age: 85
End: 2020-01-16

## 2020-01-16 DIAGNOSIS — J98.01 ACUTE BRONCHOSPASM: ICD-10-CM

## 2020-01-16 DIAGNOSIS — J31.0 CHRONIC RHINITIS: ICD-10-CM

## 2020-01-16 DIAGNOSIS — R05.9 COUGH: Primary | ICD-10-CM

## 2020-01-16 RX ORDER — CODEINE PHOSPHATE AND GUAIFENESIN 10; 100 MG/5ML; MG/5ML
1-2 SOLUTION ORAL EVERY 6 HOURS PRN
Qty: 180 ML | Refills: 0 | Status: SHIPPED | OUTPATIENT
Start: 2020-01-16 | End: 2020-03-17

## 2020-01-16 RX ORDER — IPRATROPIUM BROMIDE 21 UG/1
2 SPRAY, METERED NASAL EVERY 12 HOURS
Qty: 30 ML | Refills: 5 | Status: SHIPPED | OUTPATIENT
Start: 2020-01-16 | End: 2020-09-17

## 2020-01-16 NOTE — TELEPHONE ENCOUNTER
RN spoke with patient, as both medications discontinued on Epic.     He states that he uses the nasal spray for common colds that he gets during the winter months.     He states that he does not have a cough right now, but also uses the cough syrup for cough/colds that he gets during the winter months.     RN advised that if he is asymptomatic, that Dr. Pabon may not be willing to prescribe. Patient verbalized understanding.     Routed to PCP to review and advise.     Mariaa Love RN, BSN, PHN  [facility]: [place]

## 2020-01-16 NOTE — TELEPHONE ENCOUNTER
RN called, notified patient's wife that refills were sent.     Mariaa Love RN, BSN, PHN  [facility]: [place]

## 2020-01-16 NOTE — TELEPHONE ENCOUNTER
I know Vinnie well and am happy to refill those medications for him to be used as needed in the future.

## 2020-01-16 NOTE — TELEPHONE ENCOUNTER
Patient mailed a request to PCP to send in prescriptions for nasal spray Ipratropium Bromide and Cheratussin AC that he uses for a sinus issue that he gets annually. Patient requesting medications be sent to [facility].

## 2020-03-10 ENCOUNTER — TELEPHONE (OUTPATIENT)
Dept: FAMILY MEDICINE | Facility: CLINIC | Age: 85
End: 2020-03-10

## 2020-03-10 DIAGNOSIS — R05.9 COUGH: Primary | ICD-10-CM

## 2020-03-10 RX ORDER — BENZONATATE 100 MG/1
100 CAPSULE ORAL 3 TIMES DAILY PRN
Qty: 30 CAPSULE | Refills: 1 | Status: SHIPPED | OUTPATIENT
Start: 2020-03-10 | End: 2020-09-17

## 2020-03-10 NOTE — TELEPHONE ENCOUNTER
Reason for Call:  Other     Detailed comments: Spouse wanted provider to know that patient has a cough and fever and thinks he will feel better after a nap but family members told her to call and report his condition.    Phone Number   Pat Duran () [phone] (H)         Best Time:     Can we leave a detailed message on this number? YES    Call taken on 3/10/2020 at 10:08 AM by Radha Montalvo

## 2020-03-10 NOTE — TELEPHONE ENCOUNTER
RN called patient and relayed provider's message. Patient verbalized understanding.     Mariaa Love RN, BSN, PHN  [facility]: [place]

## 2020-03-10 NOTE — TELEPHONE ENCOUNTER
RN spoke with wife    Patient's fever started Sunday night (100.0), but states has resolved today.    Reports non-productive cough, which made it difficult for patient to sleep last night. Denies SOB or difficulty breathing. Denies any other symptoms.     Patient has been taking tylenol, and took Nyquil today to sleep.     Denies recent exposure to anyone with confirmed influenza.    Patient's wife wanted PCP to know of this, per patient's age.     Does not feel that he needs to be seen, as she believes he is slowly getting better.     She is wondering if there is another OTC or prescription medication he could try to help reduce the cough while sleeping?     Pharmacy cued if applicable.     Routed to PCP to review and advise.     Mariaa Love, RN, BSN, PHN  [facility]: [place]

## 2020-03-16 DIAGNOSIS — H10.33 ACUTE CONJUNCTIVITIS OF BOTH EYES, UNSPECIFIED ACUTE CONJUNCTIVITIS TYPE: ICD-10-CM

## 2020-03-16 DIAGNOSIS — R05.9 COUGH: ICD-10-CM

## 2020-03-16 RX ORDER — POLYMYXIN B SULFATE AND TRIMETHOPRIM 1; 10000 MG/ML; [USP'U]/ML
1-2 SOLUTION OPHTHALMIC 4 TIMES DAILY
Qty: 1 BOTTLE | Refills: 4 | Status: SHIPPED | OUTPATIENT
Start: 2020-03-16 | End: 2020-09-17

## 2020-03-16 RX ORDER — CODEINE PHOSPHATE AND GUAIFENESIN 10; 100 MG/5ML; MG/5ML
SOLUTION ORAL EVERY 6 HOURS PRN
Status: CANCELLED | OUTPATIENT
Start: 2020-03-16

## 2020-03-16 NOTE — TELEPHONE ENCOUNTER
Reason for Call:  Medication or medication refill:    Do you use a [facility]?  Name of the pharmacy and phone number for the current request:  [id number] - [place], [place] - [address] [phone] (Phone)  [phone] (Fax)       Name of the medication requested: trimethoprim-polymyxin b (POLYTRIM) 86130-3.1 UNIT/ML-% ophthalmic solutionPlace 1-2 drops into both eyes 4 times daily until the eyes are clear       Other request: Pt stated no refills left    Can we leave a detailed message on this number? YES    Phone number patient can be reached at: Cell number on file:    No relevant phone numbers on file.       Best Time: Anytime      Call taken on 3/16/2020 at 9:21 AM by Kacie Day

## 2020-03-17 ENCOUNTER — VIRTUAL VISIT (OUTPATIENT)
Dept: FAMILY MEDICINE | Facility: CLINIC | Age: 85
End: 2020-03-17
Payer: COMMERCIAL

## 2020-03-17 DIAGNOSIS — J06.9 UPPER RESPIRATORY TRACT INFECTION, UNSPECIFIED TYPE: Primary | ICD-10-CM

## 2020-03-17 DIAGNOSIS — R05.9 COUGH: ICD-10-CM

## 2020-03-17 PROCEDURE — 99441 ZZC PHYSICIAN TELEPHONE EVALUATION 5-10 MIN: CPT | Performed by: FAMILY MEDICINE

## 2020-03-17 RX ORDER — CODEINE PHOSPHATE AND GUAIFENESIN 10; 100 MG/5ML; MG/5ML
1-2 SOLUTION ORAL EVERY 6 HOURS PRN
Qty: 180 ML | Refills: 0 | Status: SHIPPED | OUTPATIENT
Start: 2020-03-17 | End: 2020-09-17

## 2020-03-17 NOTE — PROGRESS NOTES
"Vinnie Duran is a 86 year old male who is being evaluated via a billable telephone visit.      The patient has been notified of following:     \"This telephone visit will be conducted via a call between you and your physician/provider. We have found that certain health care needs can be provided without the need for a physical exam.  This service lets us provide the care you need with a short phone conversation.  If a prescription is necessary we can send it directly to your pharmacy.  If lab work is needed we can place an order for that and you can then stop by our lab to have the test done at a later time.    If during the course of the call the physician/provider feels a telephone visit is not appropriate, you will not be charged for this service.\"     No vitals taken.  Yaneli Garsia CMA     Vinnie Duran complains of    Chief Complaint   Patient presents with     Cough       I have reviewed and updated the patient's Past Medical History, Social History, Family History and Medication List.    ALLERGIES  Patient has no known allergies.    Yaneli Garsia CMA    (MA signature)    Additional provider notes: Cough with phlegm that is yellow and dark for about 12-13 days. Couple days had chills and fever but not anymore. Headache. Very weak and sometimes dizzy.  Wife has similar symptoms now as well.  The patient is mainly requesting cough medicine with codeine to help him sleep at night.    Assessment/Plan:  Upper respiratory tract infection, unspecified type  (primary encounter diagnosis)    We discussed the coronavirus issues that are currently present, including the lack of testing for that as of late this morning.  I therefore recommended symptomatic treatment and expectant management.  I directed them to \"oncare\" if his symptoms worsen instead of improve.  I will go ahead and prescribe Robitussin-AC for him to use as needed, especially at night to help with sleep.    I have reviewed the note as documented " above.  This accurately captures the substance of my conversation with the patient.  Navdeep Pabon MD      Phone call contact time  Call Started at 10:58 am  Call Ended at 11:06 am    Navdeep Pabon MD

## 2020-05-15 ENCOUNTER — TELEPHONE (OUTPATIENT)
Dept: FAMILY MEDICINE | Facility: CLINIC | Age: 85
End: 2020-05-15

## 2020-05-15 ENCOUNTER — VIRTUAL VISIT (OUTPATIENT)
Dept: FAMILY MEDICINE | Facility: CLINIC | Age: 85
End: 2020-05-15
Payer: COMMERCIAL

## 2020-05-15 DIAGNOSIS — R31.9 HEMATURIA, UNSPECIFIED TYPE: ICD-10-CM

## 2020-05-15 DIAGNOSIS — R30.0 DYSURIA: Primary | ICD-10-CM

## 2020-05-15 PROCEDURE — 99213 OFFICE O/P EST LOW 20 MIN: CPT | Mod: 95 | Performed by: PHYSICIAN ASSISTANT

## 2020-05-15 RX ORDER — SULFAMETHOXAZOLE/TRIMETHOPRIM 800-160 MG
1 TABLET ORAL 2 TIMES DAILY
Qty: 20 TABLET | Refills: 0 | Status: SHIPPED | OUTPATIENT
Start: 2020-05-15 | End: 2020-05-25

## 2020-05-15 NOTE — TELEPHONE ENCOUNTER
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  The doctor told patient he has a small amount of blood and also high white blood count. Patient has had a little burning when urinating. DOT doctor had told patient to call his PCP and let them know. DOT doctor said he might have an infection.  Please call patient.    Duration (how long have symptoms been present):      Have you been treated for this before? yes    Additional comments:     Phone Number patient can be reached at:  Home number on file [phone] (home)    Best Time:  any    Can we leave a detailed message on this number:  YES    Call taken on 5/15/2020 at 12:10 PM by Lesly Lucero

## 2020-05-15 NOTE — PROGRESS NOTES
"Vinnie Duran is a 86 year old male who is being evaluated via a billable telephone visit.      The patient has been notified of following:     \"This telephone visit will be conducted via a call between you and your physician/provider. We have found that certain health care needs can be provided without the need for a physical exam.  This service lets us provide the care you need with a short phone conversation.  If a prescription is necessary we can send it directly to your pharmacy.  If lab work is needed we can place an order for that and you can then stop by our lab to have the test done at a later time.    Telephone visits are billed at different rates depending on your insurance coverage. During this emergency period, for some insurers they may be billed the same as an in-person visit.  Please reach out to your insurance provider with any questions.    If during the course of the call the physician/provider feels a telephone visit is not appropriate, you will not be charged for this service.\"    Patient has given verbal consent for Telephone visit?  Yes    What phone number would you like to be contacted at? [phone]    How would you like to obtain your AVS? Mail a copy    Subjective     Vinnie Duran is a 86 year old male who presents to clinic today for the following health issues:    HPI  Genitourinary - Male  Onset: today    Description:   Dysuria (painful urination): no, just little bit of burning and stinging  Hematuria (blood in urine): YES- UA at [employer] shows traces of blood, but patient does not notice any himself  Frequency: no   Are you urinating at night : YES- maybe once a night  Hesitancy (delay in urine): no   Retention (unable to empty): no   Decrease in urinary flow: no   Incontinence: no, sometimes it is a small amount    Progression of Symptoms:  same    Accompanying Signs & Symptoms:  Fever: no   Back/Flank pain: no   Urethral discharge: no   Testicle lumps/masses/pain: no   Nausea " and/or vomiting: no   Abdominal pain: no     History:   History of frequent UTI's: not to patient's knowledge  History of kidney stones: YES, way back 20 years ago  History of hernias: YES  Personal or Family history of Prostate problems: not sure  Sexually active: No, ED, patient did take something for ED this morning     Precipitating factors:   Nothing     Alleviating factors:  Nothing     UA showed some traces of blood and high white count. He says the DOT physician said he had signs of an infection and to contact us.    He is not having fevers, chills,  / CVA pain other than dysuria. Denies other symptoms. Pushing fluids, primarily.     No significant tobacco use history.    Patient Active Problem List   Diagnosis     Erectile dysfunction     Hyperlipidemia LDL goal <100     Advanced directives, counseling/discussion     Chronic rhinitis     Benign non-nodular prostatic hyperplasia with lower urinary tract symptoms     Thrombocytopenia (H)     Actinic keratoses     Gastroesophageal reflux disease, esophagitis presence not specified     History of melanoma     Urinary retention with incomplete bladder emptying     Impaired fasting glucose     CLL (chronic lymphocytic leukemia) (H)     CKD (chronic kidney disease) stage 3, GFR 30-59 ml/min (H)      Current Outpatient Medications   Medication     APPLE CIDER VINEGAR PO     FISH OIL     Multiple Vitamin (ONE-A-DAY MENS PO)     sulfamethoxazole-trimethoprim (BACTRIM DS) 800-160 MG tablet     benzonatate (TESSALON) 100 MG capsule     guaiFENesin-codeine (ROBITUSSIN AC) 100-10 MG/5ML solution     ipratropium (ATROVENT) 0.03 % nasal spray     trimethoprim-polymyxin b (POLYTRIM) 33072-9.1 UNIT/ML-% ophthalmic solution     No current facility-administered medications for this visit.           Reviewed and updated as needed this visit by Provider         Review of Systems   Constitutional, HEENT, cardiovascular, pulmonary, gi and gu systems are negative, except as  otherwise noted.       Objective   Reported vitals:  There were no vitals taken for this visit.   healthy, alert and no distress  PSYCH: Alert and oriented times 3; coherent speech, normal   rate and volume, able to articulate logical thoughts, able   to abstract reason, no tangential thoughts, no hallucinations   or delusions  His affect is normal  RESP: No cough, no audible wheezing, able to talk in full sentences  Remainder of exam unable to be completed due to telephone visits    Diagnostic Test Results:  Labs reviewed in Epic        Assessment/Plan:  1. Dysuria  May have an infection. I do not have the UA results today as I am at home and these were not available to be faxed to us.   Reviewed options - he prefers to treat. Will prescribe. This prescription is given with a discussion of side effects, risks and proper use.  Instructions are given to follow up if not improving or symptoms change or worsen as discussed.     Avoid excessive sunlight.     Recommended a recheck urine when able to ensure that blood has resolved and he agrees.    - sulfamethoxazole-trimethoprim (BACTRIM DS) 800-160 MG tablet; Take 1 tablet by mouth 2 times daily for 10 days  Dispense: 20 tablet; Refill: 0  - UA with Microscopic reflex to Culture; Future    2. Hematuria, unspecified type  As noted. Recommend a recheck urine in a week or two to ensure has resolved.   - UA with Microscopic reflex to Culture; Future    No follow-ups on file.      Phone call duration:  5 minutes    MICHELLE Russ, PA-C

## 2020-05-15 NOTE — TELEPHONE ENCOUNTER
I called patient, he denies fever, chills, nausea or severe pain.   Says he has just a little burning with urination.   Does not see blood in urine.    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    Rosa Murphy RN  [facility]

## 2020-06-24 ENCOUNTER — TELEPHONE (OUTPATIENT)
Dept: FAMILY MEDICINE | Facility: CLINIC | Age: 85
End: 2020-06-24

## 2020-06-24 DIAGNOSIS — N52.01 ERECTILE DYSFUNCTION DUE TO ARTERIAL INSUFFICIENCY: Primary | ICD-10-CM

## 2020-06-24 NOTE — TELEPHONE ENCOUNTER
The patient sent me a letter asking my input regarding his erectile dysfunction.  He has tried numerous over-the-counter and prescription pills, but they have not been helpful.  He wondered about testosterone shots.  I see that he did have his testosterone checked a couple of years ago by an outside urologist.  It was normal.  I discouraged using testosterone as a treatment for this, but I did offer him urology consultation regarding this topic for possible injection therapy or other procedural intervention.  He was open to considering this, so I made a referral for him with our urologist, Dr. Zavaleta, and the patient's could schedule a visit with Dr. Zavaleta to discuss further treatment options for his erectile dysfunction as needed/desired.

## 2020-06-24 NOTE — TELEPHONE ENCOUNTER
Reason for Call:  Other call back    Detailed comments: Patient states he is returning a call from Dr. Pabon but there isn't anything indicated in the chart.  Please call him back.    Phone Number Patient can be reached at: Home number on file [phone] (home)    Best Time: anytime    Can we leave a detailed message on this number? YES    Call taken on 6/24/2020 at 1:54 PM by Denice John

## 2020-07-02 ENCOUNTER — VIRTUAL VISIT (OUTPATIENT)
Dept: UROLOGY | Facility: CLINIC | Age: 85
End: 2020-07-02
Payer: COMMERCIAL

## 2020-07-02 DIAGNOSIS — N52.9 ERECTILE DYSFUNCTION, UNSPECIFIED ERECTILE DYSFUNCTION TYPE: Primary | ICD-10-CM

## 2020-07-02 PROCEDURE — 99202 OFFICE O/P NEW SF 15 MIN: CPT | Mod: 95 | Performed by: UROLOGY

## 2020-07-02 NOTE — PROGRESS NOTES
"Vinnie Duran is a 86 year old male who is being evaluated via a billable telephone visit.      The patient has been notified of following:     \"This telephone visit will be conducted via a call between you and your physician/provider. We have found that certain health care needs can be provided without the need for a physical exam.  This service lets us provide the care you need with a short phone conversation.  If a prescription is necessary we can send it directly to your pharmacy.  If lab work is needed we can place an order for that and you can then stop by our lab to have the test done at a later time.    Telephone visits are billed at different rates depending on your insurance coverage. During this emergency period, for some insurers they may be billed the same as an in-person visit.  Please reach out to your insurance provider with any questions.    If during the course of the call the physician/provider feels a telephone visit is not appropriate, you will not be charged for this service.\"    Patient has given verbal consent for Telephone visit?  Yes    What phone number would you like to be contacted at? [phone]    How would you like to obtain your AVS? MyChart     Chief Complaint   Patient presents with     Telephone       Vinnie Duran is a 86 year old male who presents today for follow up of   Chief Complaint   Patient presents with     Telephone   Patient is a pleasant 86-year-old  male who was request be seen by Dr. Navdeep Pabon for a consultation with regard to patient's erectile dysfunction.  Patient has had   Erectile dysfunction for  a number of years.  Previously patient has tried Viagra and Cialis with good results.  For this past year however nothing seems to work.  When he has erection is 1 out of 10.  His sex drive is good.    Current Outpatient Medications   Medication Sig Dispense Refill     APPLE CIDER VINEGAR PO Take by mouth daily        FISH OIL daily        Multiple " Vitamin (ONE-A-DAY MENS PO) Take by mouth daily        benzonatate (TESSALON) 100 MG capsule Take 1 capsule (100 mg) by mouth 3 times daily as needed for cough (couch) (Patient not taking: Reported on 5/15/2020) 30 capsule 1     guaiFENesin-codeine (ROBITUSSIN AC) 100-10 MG/5ML solution Take 5-10 mLs by mouth every 6 hours as needed for cough (Patient not taking: Reported on 5/15/2020) 180 mL 0     ipratropium (ATROVENT) 0.03 % nasal spray Spray 2 sprays into both nostrils every 12 hours (Patient not taking: Reported on 5/15/2020) 30 mL 5     trimethoprim-polymyxin b (POLYTRIM) 58767-5.1 UNIT/ML-% ophthalmic solution Place 1-2 drops into both eyes 4 times daily until the eyes are clear (Patient not taking: Reported on 3/17/2020) 1 Bottle 4     No Known Allergies   Past Medical History:   Diagnosis Date     Actinic keratoses 7/19/2018     BPH (benign prostatic hypertrophy)      CKD (chronic kidney disease) stage 3, GFR 30-59 ml/min (H) 8/20/2019     CLL (chronic lymphocytic leukemia) (H) 07/2018    followed by Dr. Michela Andres from [facility]     Erectile dysfunction      GERD (gastroesophageal reflux disease)      Hyperlipidemia LDL goal <100      Impaired fasting glucose 07/2018     Melanoma (H) 2/03    superficial spreading     Past Surgical History:   Procedure Laterality Date     APPENDECTOMY       ARTHROSCOPY KNEE      right     CATARACT IOL, RT/LT  2/11    right     CATARACT IOL, RT/LT  2/13    left     HERNIA REPAIR  1989    bilat inguinal     TONSILLECTOMY       TURP  2008 and 5/18/17     No family history on file.  Social History     Socioeconomic History     Marital status:      Spouse name: None     Number of children: None     Years of education: None     Highest education level: None   Occupational History     None   Social Needs     Financial resource strain: None     Food insecurity     Worry: None     Inability: None     Transportation needs     Medical: None     Non-medical: None   Tobacco Use      Smoking status: Former Smoker     Smokeless tobacco: Never Used     Tobacco comment: high school   Substance and Sexual Activity     Alcohol use: No     Drug use: No     Sexual activity: Yes     Partners: Female   Lifestyle     Physical activity     Days per week: None     Minutes per session: None     Stress: None   Relationships     Social connections     Talks on phone: None     Gets together: None     Attends Evangelical service: None     Active member of club or organization: None     Attends meetings of clubs or organizations: None     Relationship status: None     Intimate partner violence     Fear of current or ex partner: None     Emotionally abused: None     Physically abused: None     Forced sexual activity: None   Other Topics Concern     Parent/sibling w/ CABG, MI or angioplasty before 65F 55M? No   Social History Narrative     None       REVIEW OF SYSTEMS  =================  C: NEGATIVE for fever, chills, change in weight  I: NEGATIVE for worrisome rashes, moles or lesions  E/M: NEGATIVE for ear, mouth and throat problems  R: NEGATIVE for significant cough or SHORTNESS OF BREATH  CV:  NEGATIVE for chest pain, palpitations or peripheral edema  GI: NEGATIVE for nausea, abdominal pain, heartburn, or change in bowel habits  NEURO: NEGATIVE numbness/weakness  : see HPI  PSYCH: NEGATIVE depression/anxiety  LYmph: no new enlarged lymph nodes  Ortho: no new trauma/movements    Physical Exam:     GENERAL: healthy, alert and no distress  EYES: Eyes grossly normal to inspection, conjunctivae and sclerae normal  RESP: no audible wheeze, cough, or visible cyanosis.  No visible retractions or increased work of breathing.  Able to speak fully in complete sentences.  NEURO: Cranial nerves grossly intact, mentation intact and speech normal  PSYCH: mentation appears normal, affect normal/bright, judgement and insight intact, normal speech and appearance well-groomed    Assessment/Plan:   (N52.9) Erectile  dysfunction, unspecified erectile dysfunction type  (primary encounter diagnosis)  Comment:    Plan:     Discussed treatment options at length with patient today.  We discussed vacuum pump, penile injection,  and penile Prosthesis.  Pros and cons discussed.  Patient will contact me with regard to his decision.                    Phone call duration: 11 minutes    Elvis Zavaleta MD

## 2020-07-13 ENCOUNTER — TELEPHONE (OUTPATIENT)
Dept: UROLOGY | Facility: CLINIC | Age: 85
End: 2020-07-13

## 2020-07-13 NOTE — TELEPHONE ENCOUNTER
Reason for call:  Other   Patient called regarding (reason for call): call back  Additional comments: Patient is calling because he has questions from his last visit, please call back     Phone number to reach patient:  Other phone number:  [phone]    Best Time:  any    Can we leave a detailed message on this number?  YES    Travel screening: Not Applicable

## 2020-07-14 NOTE — TELEPHONE ENCOUNTER
Called and spoke to patient.   Patient had questions in regards to ED.   Spoke to patient and additional information provided about the options discussed during clinic appointment.   Patient verbalized understanding and will reach out to us if needed.   Raysa Buckley RN

## 2020-09-17 ENCOUNTER — OFFICE VISIT (OUTPATIENT)
Dept: FAMILY MEDICINE | Facility: CLINIC | Age: 85
End: 2020-09-17
Payer: COMMERCIAL

## 2020-09-17 VITALS
DIASTOLIC BLOOD PRESSURE: 82 MMHG | BODY MASS INDEX: 27.58 KG/M2 | SYSTOLIC BLOOD PRESSURE: 137 MMHG | TEMPERATURE: 98.4 F | WEIGHT: 182 LBS | HEIGHT: 68 IN | HEART RATE: 69 BPM | OXYGEN SATURATION: 97 %

## 2020-09-17 DIAGNOSIS — D69.6 THROMBOCYTOPENIA (H): ICD-10-CM

## 2020-09-17 DIAGNOSIS — E78.5 HYPERLIPIDEMIA LDL GOAL <100: ICD-10-CM

## 2020-09-17 DIAGNOSIS — R33.9 URINARY RETENTION WITH INCOMPLETE BLADDER EMPTYING: ICD-10-CM

## 2020-09-17 DIAGNOSIS — Z00.00 ENCOUNTER FOR MEDICARE ANNUAL WELLNESS EXAM: Primary | ICD-10-CM

## 2020-09-17 DIAGNOSIS — L57.0 ACTINIC KERATOSIS: ICD-10-CM

## 2020-09-17 DIAGNOSIS — L98.9 SKIN LESION OF BACK: ICD-10-CM

## 2020-09-17 DIAGNOSIS — N52.9 ERECTILE DYSFUNCTION, UNSPECIFIED ERECTILE DYSFUNCTION TYPE: ICD-10-CM

## 2020-09-17 DIAGNOSIS — R73.01 IMPAIRED FASTING GLUCOSE: ICD-10-CM

## 2020-09-17 DIAGNOSIS — N18.30 CKD (CHRONIC KIDNEY DISEASE) STAGE 3, GFR 30-59 ML/MIN (H): ICD-10-CM

## 2020-09-17 DIAGNOSIS — C91.10 CLL (CHRONIC LYMPHOCYTIC LEUKEMIA) (H): ICD-10-CM

## 2020-09-17 LAB — HBA1C MFR BLD: 5.2 % (ref 0–5.6)

## 2020-09-17 PROCEDURE — 88305 TISSUE EXAM BY PATHOLOGIST: CPT | Performed by: FAMILY MEDICINE

## 2020-09-17 PROCEDURE — 17000 DESTRUCT PREMALG LESION: CPT | Mod: 59 | Performed by: FAMILY MEDICINE

## 2020-09-17 PROCEDURE — 80048 BASIC METABOLIC PNL TOTAL CA: CPT | Performed by: FAMILY MEDICINE

## 2020-09-17 PROCEDURE — 36415 COLL VENOUS BLD VENIPUNCTURE: CPT | Performed by: FAMILY MEDICINE

## 2020-09-17 PROCEDURE — 99000 SPECIMEN HANDLING OFFICE-LAB: CPT | Performed by: FAMILY MEDICINE

## 2020-09-17 PROCEDURE — 99213 OFFICE O/P EST LOW 20 MIN: CPT | Mod: 25 | Performed by: FAMILY MEDICINE

## 2020-09-17 PROCEDURE — 80061 LIPID PANEL: CPT | Performed by: FAMILY MEDICINE

## 2020-09-17 PROCEDURE — 11104 PUNCH BX SKIN SINGLE LESION: CPT | Performed by: FAMILY MEDICINE

## 2020-09-17 PROCEDURE — 99397 PER PM REEVAL EST PAT 65+ YR: CPT | Mod: 25 | Performed by: FAMILY MEDICINE

## 2020-09-17 PROCEDURE — 83036 HEMOGLOBIN GLYCOSYLATED A1C: CPT | Performed by: FAMILY MEDICINE

## 2020-09-17 PROCEDURE — 85025 COMPLETE CBC W/AUTO DIFF WBC: CPT | Performed by: FAMILY MEDICINE

## 2020-09-17 RX ORDER — ACETAMINOPHEN 160 MG/5ML
SUSPENSION, ORAL (FINAL DOSE FORM) ORAL DAILY
COMMUNITY
End: 2020-10-19

## 2020-09-17 RX ORDER — GINSENG 100 MG
CAPSULE ORAL DAILY
COMMUNITY

## 2020-09-17 RX ORDER — MULTIVIT-MIN/IRON/FOLIC ACID/K 18-600-40
CAPSULE ORAL DAILY
COMMUNITY

## 2020-09-17 ASSESSMENT — ENCOUNTER SYMPTOMS
CHILLS: 0
COUGH: 0
DIZZINESS: 0
HEMATURIA: 0
ABDOMINAL PAIN: 0
DIARRHEA: 0
HEMATOCHEZIA: 0
CONSTIPATION: 0

## 2020-09-17 ASSESSMENT — MIFFLIN-ST. JEOR: SCORE: 1472.43

## 2020-09-17 ASSESSMENT — ACTIVITIES OF DAILY LIVING (ADL): CURRENT_FUNCTION: NO ASSISTANCE NEEDED

## 2020-09-17 NOTE — LETTER
[facility]   [address] [place]  [address]  [phone]                                   September 23, 2020    Vinnie Washington  [address]   [facility] [address]        Dear Vinnie,    Your white blood cell count is higher than previously (due to your CLL), but the rest of your lab work generally looks good and/or stable from the past.   The biopsy from the spot on your back did show a basal cell skin cancer.  This is not a dangerous type of skin cancer like the melanoma you had in the past.  It is the most common and least serious type of skin cancer, but should be removed.  Please call and schedule a 40-minute appointment with me at your convenience in the upcoming weeks so that I can remove that for you.     Results for orders placed or performed in visit on 09/17/20   Basic metabolic panel     Status: Abnormal   Result Value Ref Range    Sodium 144 133 - 144 mmol/L    Potassium 4.4 3.4 - 5.3 mmol/L    Chloride 112 (H) 94 - 109 mmol/L    Carbon Dioxide 25 20 - 32 mmol/L    Anion Gap 7 3 - 14 mmol/L    Glucose 104 (H) 70 - 99 mg/dL    Urea Nitrogen 26 7 - 30 mg/dL    Creatinine 1.29 (H) 0.66 - 1.25 mg/dL    GFR Estimate 50 (L) >60 mL/min/[1.73_m2]    GFR Estimate If Black 57 (L) >60 mL/min/[1.73_m2]    Calcium 9.2 8.5 - 10.1 mg/dL   Surgical pathology exam     Status: None   Result Value Ref Range    Copath Report       Patient Name: VINNIE WASHINGTON  MR#: [id number]  Specimen #: [id number]  Collected: 9/17/2020  Received: 9/21/2020  Reported: 9/22/2020 09:18  Ordering Phy(s): DONA LARA    For improved result formatting, select 'View Enhanced Report Format' under   Linked Documents section.    SPECIMEN(S):  Skin, upper mid back    FINAL DIAGNOSIS:  Skin of upper mid back, lesion, biopsy-  - Superficial basal cell carcinoma. Margins are negative for malignancy.    Electronically signed out by:    Stephenie Ingram M.D.    CLINICAL HISTORY:  Skin lesion of back,  "erythematous, rule out basal cell carcinoma    GROSS:  The specimen is received in formalin with proper patient identification   labeled \"upper mid back skin biopsy\".  The specimen consists of tan rough-surfaced skin punch biopsy measuring   0.2 x 0.2 x 0.2 cm.  The specimen is  inked blue.  The specimen is entirely submitted in one cassette.    MICROSCOPIC:  A formal microscopic exam is performed.    The technical component of this testing  was completed at the [facility], with the professional component performed   at the [facility], [address] [place] [address]  [phone] ([phone])    CPT Codes:  A: 98576-OI9    COLLECTION SITE:  Client: [facility]  Location: [facility] (B)     Hemoglobin A1c     Status: None   Result Value Ref Range    Hemoglobin A1C 5.2 0 - 5.6 %   Lipid panel reflex to direct LDL Non-fasting     Status: Abnormal   Result Value Ref Range    Cholesterol 151 <200 mg/dL    Triglycerides 124 <150 mg/dL    HDL Cholesterol 27 (L) >39 mg/dL    LDL Cholesterol Calculated 99 <100 mg/dL    Non HDL Cholesterol 124 <130 mg/dL   CBC with platelets and differential     Status: Abnormal   Result Value Ref Range    WBC 96.2 (HH) 4.0 - 11.0 10e9/L    RBC Count 4.17 (L) 4.4 - 5.9 10e12/L    Hemoglobin 13.7 13.3 - 17.7 g/dL    Hematocrit 42.4 40.0 - 53.0 %     (H) 78 - 100 fl    MCH 32.9 26.5 - 33.0 pg    MCHC 32.3 31.5 - 36.5 g/dL    RDW 13.7 10.0 - 15.0 %    Platelet Count 144 (L) 150 - 450 10e9/L    % Neutrophils 10.0 %    % Lymphocytes 90.0 %    Absolute Neutrophil 9.6 (H) 1.6 - 8.3 10e9/L    Absolute Lymphocytes 86.6 (H) 0.8 - 5.3 10e9/L    Microcytes Present     Macrocytes Present     Smudge Cells Present     Reactive Lymphs Present     Platelet Estimate       Automated count confirmed.  Platelet morphology is normal.    Diff Method Manual Differential        If you have " any questions please call the clinic at [phone]    Sincerely,    Navdeep Pabon MD  bmd

## 2020-09-17 NOTE — PATIENT INSTRUCTIONS
Patient Education   Personalized Prevention Plan  You are due for the preventive services outlined below.  Your care team is available to assist you in scheduling these services.  If you have already completed any of these items, please share that information with your care team to update in your medical record.  Health Maintenance Due   Topic Date Due     Zoster (Shingles) Vaccine (1 of 2) 10/12/1983     PHQ-2  01/01/2020     FALL RISK ASSESSMENT  08/20/2020     Flu Vaccine (1) 09/01/2020     Annual Wellness Visit  08/20/2020

## 2020-09-17 NOTE — PROGRESS NOTES
"SUBJECTIVE:   Vinnie Duran is a 86 year old male who presents for a Preventive Visit and follow-up on baseline health conditions.      Patient has been advised of split billing requirements and indicates understanding: Yes   Are you in the first 12 months of your Medicare coverage?  No    Healthy Habits:     In general, how would you rate your overall health?  Good    Frequency of exercise:  2-3 days/week    Duration of exercise:  15-30 minutes    Do you usually eat at least 4 servings of fruit and vegetables a day, include whole grains    & fiber and avoid regularly eating high fat or \"junk\" foods?  Yes    Taking medications regularly:  Yes    Medication side effects:  None    Ability to successfully perform activities of daily living:  No assistance needed    Home Safety:  Lack of grab bars in the bathroom    Hearing Impairment:  Need to ask people to speak up or repeat themselves    In the past 6 months, have you been bothered by leaking of urine?  No    In general, how would you rate your overall mental or emotional health?  Good      PHQ-2 Total Score: 0    Additional concerns today:  No    Do you feel safe in your environment? Yes    Have you ever done Advance Care Planning? (For example, a Health Directive, POLST, or a discussion with a medical provider or your loved ones about your wishes): No, advance care planning information given to patient to review.  Patient declined advance care planning discussion at this time.      Fall risk  Fallen 2 or more times in the past year?: No  Any fall with injury in the past year?: No    Cognitive Screening   1) Repeat 3 items (Leader, Season, Table)    2) Clock draw: NORMAL  3) 3 item recall: Recalls 3 objects  Results: 3 items recalled: COGNITIVE IMPAIRMENT LESS LIKELY    Mini-CogTM Copyright JACK Aparicio. Licensed by the author for use in [employer]; reprinted with permission (bryce@.[facility]). All rights reserved.      Do you have sleep apnea, excessive " snoring or daytime drowsiness?: yes    Reviewed and updated as needed this visit by clinical staff         Reviewed and updated as needed this visit by Provider        Social History     Tobacco Use     Smoking status: Former Smoker     Smokeless tobacco: Never Used     Tobacco comment: high school   Substance Use Topics     Alcohol use: No     If you drink alcohol do you typically have >3 drinks per day or >7 drinks per week? No    Alcohol Use 9/17/2020   Prescreen: >3 drinks/day or >7 drinks/week? Not Applicable   Prescreen: >3 drinks/day or >7 drinks/week? -     He is on no routine prescription medications now.  He does have a history of CLL and thrombocytopenia and CKD.  He also has longstanding erectile dysfunction and had a virtual visit with urology recently for that, but does not want to pursue any procedural intervention.  He does do intermittent self-catheterization because of incomplete bladder emptying.  He is generally feeling well.      Current providers sharing in care for this patient include:   Patient Care Team:  Navdeep Pabon MD as PCP - General  Navdeep Pabon MD as Assigned PCP    The following health maintenance items are reviewed in Epic and correct as of today:  Health Maintenance   Topic Date Due     ZOSTER IMMUNIZATION (1 of 2) 10/12/1983     PHQ-2  01/01/2020     FALL RISK ASSESSMENT  08/20/2020     INFLUENZA VACCINE (1) 09/01/2020     MEDICARE ANNUAL WELLNESS VISIT  08/20/2020     ADVANCE CARE PLANNING  07/19/2023     DTAP/TDAP/TD IMMUNIZATION (3 - Td) 11/05/2025     PNEUMOCOCCAL IMMUNIZATION 65+ HIGH/HIGHEST RISK  Completed     IPV IMMUNIZATION  Aged Out     MENINGITIS IMMUNIZATION  Aged Out     HEPATITIS B IMMUNIZATION  Aged Out     Patient Active Problem List   Diagnosis     Erectile dysfunction     Hyperlipidemia LDL goal <100     Advanced directives, counseling/discussion     Chronic rhinitis     Benign non-nodular prostatic hyperplasia with lower urinary tract symptoms      Thrombocytopenia (H)     Actinic keratoses     Gastroesophageal reflux disease, esophagitis presence not specified     History of melanoma     Urinary retention with incomplete bladder emptying     Impaired fasting glucose     CLL (chronic lymphocytic leukemia) (H)     CKD (chronic kidney disease) stage 3, GFR 30-59 ml/min (H)     Past Surgical History:   Procedure Laterality Date     APPENDECTOMY       ARTHROSCOPY KNEE      right     CATARACT IOL, RT/LT  2/11    right     CATARACT IOL, RT/LT  2/13    left     HERNIA REPAIR  1989    bilat inguinal     TONSILLECTOMY       TURP  2008 and 5/18/17       Social History     Tobacco Use     Smoking status: Former Smoker     Smokeless tobacco: Never Used     Tobacco comment: high school   Substance Use Topics     Alcohol use: No     History reviewed. No pertinent family history.      Current Outpatient Medications   Medication Sig Dispense Refill     APPLE CIDER VINEGAR PO Take by mouth daily        Ascorbic Acid (VITAMIN C) 500 MG CAPS Take by mouth daily       Cholecalciferol (D3 VITAMIN PO) Take 25 mcg by mouth daily       FISH OIL daily        Garlic 1000 MG CAPS Take by mouth daily       GINSENG PO Take by mouth daily       Multiple Vitamin (ONE-A-DAY MENS PO) Take by mouth daily        Saw Palmetto, Serenoa repens, (SAW PALMETTO EXTRACT) 160 MG CAPS Take by mouth daily       VITAMIN E PO Take 180 mg by mouth daily       zinc 50 MG TABS Take by mouth daily       No Known Allergies      Review of Systems   Constitutional: Negative for chills.   HENT: Negative for congestion.    Respiratory: Negative for cough.    Cardiovascular: Negative for chest pain.   Gastrointestinal: Negative for abdominal pain, constipation, diarrhea and hematochezia.   Genitourinary: Negative for hematuria.   Neurological: Negative for dizziness.         OBJECTIVE:   /82 (BP Location: Right arm, Patient Position: Sitting, Cuff Size: Adult Regular)   Pulse 69   Temp 98.4  F (36.9  C)  "(Oral)   Ht 1.715 m (5' 7.52\")   Wt 82.6 kg (182 lb)   SpO2 97%   BMI 28.07 kg/m   Estimated body mass index is 28.45 kg/m  as calculated from the following:    Height as of 5/30/19: 1.718 m (5' 7.62\").    Weight as of 9/5/19: 83.9 kg (185 lb).  Physical Exam  GENERAL: healthy, alert and no distress  EYES: Eyes grossly normal to inspection, PERRL and conjunctivae and sclerae normal  HENT: Grossly normal  NECK: no adenopathy, no asymmetry, masses, or scars and thyroid normal to palpation  RESP: lungs clear to auscultation - no rales, rhonchi or wheezes  CV: regular rate and rhythm, normal S1 S2, no S3 or S4, no murmur, click or rub, no peripheral edema   ABDOMEN: soft, nontender, no hepatosplenomegaly, no masses   MS: no gross musculoskeletal defects noted, no edema  SKIN: He has numerous seborrheic keratoses that were noted on his trunk and extremities.  He also has an erythematous slightly raised area about 1 cm diameter on his upper mid back which looks suspicious for basal cell carcinoma.  After discussion with the patient, it was cleansed, anesthetized, and then biopsied with a 3 mm punch.  He also has an erythematous scaly 1-1/2 cm diameter area on his right upper forehead typical for an actinic keratosis.  This was frozen with liquid nitrogen.  NEURO: Normal strength and tone, mentation intact and speech normal  PSYCH: mentation appears normal, affect normal/bright    Diagnostic Test Results:  Labs reviewed in Epic    ASSESSMENT / PLAN:       ICD-10-CM    1. Encounter for Medicare annual wellness exam  Z00.00    2. CLL (chronic lymphocytic leukemia) (H)  C91.10 CBC with platelets   3. Thrombocytopenia (H)  D69.6    4. CKD (chronic kidney disease) stage 3, GFR 30-59 ml/min (H)  N18.3 Basic metabolic panel   5. Skin lesion of back  L98.9 HANDLING CHARGE TO REF LAB     Surgical pathology exam      PUNCH BIOPSY OF SKIN, FIRST LESION   6. Actinic keratosis  L57.0 DESTRUCT PREMALIGNANT LESION, FIRST   7. " "Urinary retention with incomplete bladder emptying  R33.9    8. Impaired fasting glucose  R73.01 Hemoglobin A1c   9. Erectile dysfunction, unspecified erectile dysfunction type  N52.9    10. Hyperlipidemia LDL goal <100  E78.5 Lipid panel reflex to direct LDL Non-fasting     Blood pressure and other vital signs look fine  We discussed the above items  We will check some nonfasting lab work today as above  I suspect we will be seeing him back for removal of the mid upper back lesion  We will hold off on giving him a flu shot today, but perhaps do it at that visit or sometime next month  Continue intermittent self-catheterization  I will inform him of lab and biopsy results when available and further plan      Patient has been advised of split billing requirements and indicates understanding: Yes  COUNSELING:  Reviewed preventive health counseling, as reflected in patient instructions       Regular exercise       Healthy diet/nutrition    Estimated body mass index is 28.45 kg/m  as calculated from the following:    Height as of 5/30/19: 1.718 m (5' 7.62\").    Weight as of 9/5/19: 83.9 kg (185 lb).        He reports that he has quit smoking. He has never used smokeless tobacco.      Appropriate preventive services were discussed with this patient, including applicable screening as appropriate for cardiovascular disease, diabetes, osteopenia/osteoporosis, and glaucoma.  As appropriate for age/gender, discussed screening for colorectal cancer, prostate cancer, breast cancer, and cervical cancer. Checklist reviewing preventive services available has been given to the patient.    Reviewed patients plan of care and provided an AVS. The Basic Care Plan (routine screening as documented in Health Maintenance) for Vinnie meets the Care Plan requirement. This Care Plan has been established and reviewed with the Patient.    Counseling Resources:  ATP IV Guidelines  Pooled Cohorts Equation Calculator  Breast Cancer Risk " Calculator  Breast Cancer: Medication to Reduce Risk  FRAX Risk Assessment  ICSI Preventive Guidelines  Dietary Guidelines for Americans, 2010  [employer] (WPP)'s MyPlate  ASA Prophylaxis  Lung CA Screening    Navdeep Pabon MD  [facility]    Identified Health Risks:

## 2020-09-18 LAB
ANION GAP SERPL CALCULATED.3IONS-SCNC: 7 MMOL/L (ref 3–14)
BUN SERPL-MCNC: 26 MG/DL (ref 7–30)
CALCIUM SERPL-MCNC: 9.2 MG/DL (ref 8.5–10.1)
CHLORIDE SERPL-SCNC: 112 MMOL/L (ref 94–109)
CHOLEST SERPL-MCNC: 151 MG/DL
CO2 SERPL-SCNC: 25 MMOL/L (ref 20–32)
CREAT SERPL-MCNC: 1.29 MG/DL (ref 0.66–1.25)
DIFFERENTIAL METHOD BLD: ABNORMAL
ERYTHROCYTE [DISTWIDTH] IN BLOOD BY AUTOMATED COUNT: 13.7 % (ref 10–15)
GFR SERPL CREATININE-BSD FRML MDRD: 50 ML/MIN/{1.73_M2}
GLUCOSE SERPL-MCNC: 104 MG/DL (ref 70–99)
HCT VFR BLD AUTO: 42.4 % (ref 40–53)
HDLC SERPL-MCNC: 27 MG/DL
HGB BLD-MCNC: 13.7 G/DL (ref 13.3–17.7)
LDLC SERPL CALC-MCNC: 99 MG/DL
LYMPHOCYTES # BLD AUTO: 86.6 10E9/L (ref 0.8–5.3)
LYMPHOCYTES NFR BLD AUTO: 90 %
MACROCYTES BLD QL SMEAR: PRESENT
MCH RBC QN AUTO: 32.9 PG (ref 26.5–33)
MCHC RBC AUTO-ENTMCNC: 32.3 G/DL (ref 31.5–36.5)
MCV RBC AUTO: 102 FL (ref 78–100)
MICROCYTES BLD QL SMEAR: PRESENT
NEUTROPHILS # BLD AUTO: 9.6 10E9/L (ref 1.6–8.3)
NEUTROPHILS NFR BLD AUTO: 10 %
NONHDLC SERPL-MCNC: 124 MG/DL
PLATELET # BLD AUTO: 144 10E9/L (ref 150–450)
PLATELET # BLD EST: ABNORMAL 10*3/UL
POTASSIUM SERPL-SCNC: 4.4 MMOL/L (ref 3.4–5.3)
RBC # BLD AUTO: 4.17 10E12/L (ref 4.4–5.9)
SMUDGE CELLS BLD QL SMEAR: PRESENT
SODIUM SERPL-SCNC: 144 MMOL/L (ref 133–144)
TRIGL SERPL-MCNC: 124 MG/DL
VARIANT LYMPHS BLD QL SMEAR: PRESENT
WBC # BLD AUTO: 96.2 10E9/L (ref 4–11)

## 2020-09-22 LAB — COPATH REPORT: NORMAL

## 2020-09-23 NOTE — RESULT ENCOUNTER NOTE
Vinnie,  Your white blood cell count is higher than previously (due to your CLL), but the rest of your lab work generally looks good and/or stable from the past.  The biopsy from the spot on your back did show a basal cell skin cancer.  This is not a dangerous type of skin cancer like the melanoma you had in the past.  It is the most common and least serious type of skin cancer, but should be removed.  Please call and schedule a 40-minute appointment with me at your convenience in the upcoming weeks so that I can remove that for you.    Navdeep Pabon MD

## 2020-10-19 ENCOUNTER — OFFICE VISIT (OUTPATIENT)
Dept: FAMILY MEDICINE | Facility: CLINIC | Age: 85
End: 2020-10-19
Payer: COMMERCIAL

## 2020-10-19 VITALS
BODY MASS INDEX: 28.07 KG/M2 | DIASTOLIC BLOOD PRESSURE: 81 MMHG | TEMPERATURE: 98.7 F | OXYGEN SATURATION: 93 % | SYSTOLIC BLOOD PRESSURE: 135 MMHG | HEART RATE: 73 BPM | WEIGHT: 182 LBS

## 2020-10-19 DIAGNOSIS — C91.10 CLL (CHRONIC LYMPHOCYTIC LEUKEMIA) (H): ICD-10-CM

## 2020-10-19 DIAGNOSIS — Z23 NEED FOR PROPHYLACTIC VACCINATION AND INOCULATION AGAINST INFLUENZA: ICD-10-CM

## 2020-10-19 DIAGNOSIS — D69.6 THROMBOCYTOPENIA (H): ICD-10-CM

## 2020-10-19 DIAGNOSIS — C44.519 BASAL CELL CARCINOMA (BCC) OF BACK: Primary | ICD-10-CM

## 2020-10-19 DIAGNOSIS — M79.674 PAIN OF TOE OF RIGHT FOOT: ICD-10-CM

## 2020-10-19 PROCEDURE — 90662 IIV NO PRSV INCREASED AG IM: CPT | Performed by: FAMILY MEDICINE

## 2020-10-19 PROCEDURE — 12031 INTMD RPR S/A/T/EXT 2.5 CM/<: CPT | Performed by: FAMILY MEDICINE

## 2020-10-19 PROCEDURE — G0008 ADMIN INFLUENZA VIRUS VAC: HCPCS | Performed by: FAMILY MEDICINE

## 2020-10-19 PROCEDURE — 88305 TISSUE EXAM BY PATHOLOGIST: CPT | Performed by: PATHOLOGY

## 2020-10-19 PROCEDURE — 99000 SPECIMEN HANDLING OFFICE-LAB: CPT | Performed by: FAMILY MEDICINE

## 2020-10-19 PROCEDURE — 99213 OFFICE O/P EST LOW 20 MIN: CPT | Mod: 25 | Performed by: FAMILY MEDICINE

## 2020-10-19 PROCEDURE — 11602 EXC TR-EXT MAL+MARG 1.1-2 CM: CPT | Performed by: FAMILY MEDICINE

## 2020-10-19 NOTE — PROGRESS NOTES
Subjective     Vinine Duran is a 87 year old male who presents to clinic today for the following health issues:    HPI         Removal basal cell skin cancer mid back.    He was in about a month ago for a routine physical and noted to have an erythematous lesion on his upper mid back suspicious for a basal cell skin cancer.  The lesion was biopsied and it did come back as a BCC.  He is in now for removal of that.  We also did some lab work and it showed that his white blood cell count was significantly higher than previously.  He is followed by Dr. SHERYL Andres for CLL and apparently has an appointment coming up in the next month or 2.    He does complain of some pain in his right third toe.  His mother apparently had hammertoes and he wondered if he was getting those as well.    Patient Active Problem List   Diagnosis     Erectile dysfunction     Hyperlipidemia LDL goal <100     Advanced directives, counseling/discussion     Chronic rhinitis     Benign non-nodular prostatic hyperplasia with lower urinary tract symptoms     Thrombocytopenia (H)     Actinic keratoses     Gastroesophageal reflux disease, esophagitis presence not specified     History of melanoma     Urinary retention with incomplete bladder emptying     Impaired fasting glucose     CLL (chronic lymphocytic leukemia) (H)     CKD (chronic kidney disease) stage 3, GFR 30-59 ml/min     Current Outpatient Medications   Medication     APPLE CIDER VINEGAR PO     Ascorbic Acid (VITAMIN C) 500 MG CAPS     Cholecalciferol (D3 VITAMIN PO)     FISH OIL     Garlic 1000 MG CAPS     Multiple Vitamin (ONE-A-DAY MENS PO)     VITAMIN E PO     zinc 50 MG TABS     No current facility-administered medications for this visit.          Review of Systems   Constitutional, HEENT, cardiovascular, pulmonary, gi and gu systems are negative, except as otherwise noted.      Objective    /81 (BP Location: Right arm, Patient Position: Sitting, Cuff Size: Adult Regular)    Pulse 73   Temp 98.7  F (37.1  C) (Oral)   Wt 82.6 kg (182 lb)   SpO2 93%   BMI 28.07 kg/m    Body mass index is 28.07 kg/m .  Physical Exam   GENERAL: healthy, alert and no distress  EXT: He does have some slight hammering of his second through fourth toes, but no callus or corns or erythema or swelling.  SKIN: He has a roughly 1 cm diameter erythematous scaly skin lesion on the upper mid back.  After discussion with the patient, including risks and benefits of the procedure, we elected to proceed with the removal.  The lesion was cleansed with alcohol and then anesthetized with 1% lidocaine with epinephrine.  An elliptical excision was made around the lesion with roughly 2-3 mm borders in its width so that the total width of the excision was approximately 1.5 cm and the length was roughly 4 cm.   The wound was then closed with 3 4-0 vicryl subcuticular sutures in a layered closure followed by 9 4-0 nylon sutures through the skin.  Bacitracin and a sterile dressing were then applied.  The removed specimen was sent to pathology.      Office Visit on 09/17/2020   Component Date Value Ref Range Status     Sodium 09/17/2020 144  133 - 144 mmol/L Final     Potassium 09/17/2020 4.4  3.4 - 5.3 mmol/L Final     Chloride 09/17/2020 112* 94 - 109 mmol/L Final     Carbon Dioxide 09/17/2020 25  20 - 32 mmol/L Final     Anion Gap 09/17/2020 7  3 - 14 mmol/L Final     Glucose 09/17/2020 104* 70 - 99 mg/dL Final    Non Fasting     Urea Nitrogen 09/17/2020 26  7 - 30 mg/dL Final     Creatinine 09/17/2020 1.29* 0.66 - 1.25 mg/dL Final     GFR Estimate 09/17/2020 50* >60 mL/min/[1.73_m2] Final    Comment: Non  GFR Calc  Starting 12/18/2018, serum creatinine based estimated GFR (eGFR) will be   calculated using the Chronic Kidney Disease Epidemiology Collaboration   (CKD-EPI) equation.       GFR Estimate If Black 09/17/2020 57* >60 mL/min/[1.73_m2] Final    Comment:  GFR Calc  Starting 12/18/2018,  "serum creatinine based estimated GFR (eGFR) will be   calculated using the Chronic Kidney Disease Epidemiology Collaboration   (CKD-EPI) equation.       Calcium 09/17/2020 9.2  8.5 - 10.1 mg/dL Final     Copath Report 09/17/2020    Final                    Value:Patient Name: NEEL WASHINGTON  MR#: [id number]  Specimen #: [id number]  Collected: 9/17/2020  Received: 9/21/2020  Reported: 9/22/2020 09:18  Ordering Phy(s): DONA LARA    For improved result formatting, select 'View Enhanced Report Format' under   Linked Documents section.    SPECIMEN(S):  Skin, upper mid back    FINAL DIAGNOSIS:  Skin of upper mid back, lesion, biopsy-  - Superficial basal cell carcinoma. Margins are negative for malignancy.    Electronically signed out by:    Stephenie Ingram M.D.    CLINICAL HISTORY:  Skin lesion of back, erythematous, rule out basal cell carcinoma    GROSS:  The specimen is received in formalin with proper patient identification   labeled \"upper mid back skin biopsy\".  The specimen consists of tan rough-surfaced skin punch biopsy measuring   0.2 x 0.2 x 0.2 cm.  The specimen is  inked blue.  The specimen is entirely submitted in one cassette.    MICROSCOPIC:  A formal microscopic exam is performed.    The technical component of this testing                           was completed at the [facility], with the professional component performed   at the [facility]  [facility]atory, [address] [place] [address]  [id number] ([phone])    CPT Codes:  A: 80444-PP5    COLLECTION SITE:  Client: [facility]  Location: [facility] (B)       Hemoglobin A1C 09/17/2020 5.2  0 - 5.6 % Final    Comment: Normal <5.7% Prediabetes 5.7-6.4%  Diabetes 6.5% or higher - adopted from ADA   consensus guidelines.       Cholesterol 09/17/2020 151  <200 mg/dL Final     Triglycerides 09/17/2020 124  <150 mg/dL Final    Non Fasting     " HDL Cholesterol 09/17/2020 27* >39 mg/dL Final     LDL Cholesterol Calculated 09/17/2020 99  <100 mg/dL Final    Desirable:       <100 mg/dl     Non HDL Cholesterol 09/17/2020 124  <130 mg/dL Final     WBC 09/17/2020 96.2* 4.0 - 11.0 10e9/L Final    Comment: Critical Value called to and read back by  DR LARA AT 1655 9/17/20 BY [facility]  Results confirmed by repeat test       RBC Count 09/17/2020 4.17* 4.4 - 5.9 10e12/L Final     Hemoglobin 09/17/2020 13.7  13.3 - 17.7 g/dL Final     Hematocrit 09/17/2020 42.4  40.0 - 53.0 % Final     MCV 09/17/2020 102* 78 - 100 fl Final     MCH 09/17/2020 32.9  26.5 - 33.0 pg Final     MCHC 09/17/2020 32.3  31.5 - 36.5 g/dL Final     RDW 09/17/2020 13.7  10.0 - 15.0 % Final     Platelet Count 09/17/2020 144* 150 - 450 10e9/L Final     % Neutrophils 09/17/2020 10.0  % Final     % Lymphocytes 09/17/2020 90.0  % Final     Absolute Neutrophil 09/17/2020 9.6* 1.6 - 8.3 10e9/L Final     Absolute Lymphocytes 09/17/2020 86.6* 0.8 - 5.3 10e9/L Final     Microcytes 09/17/2020 Present   Final     Macrocytes 09/17/2020 Present   Final     Smudge Cells 09/17/2020 Present   Final     Reactive Lymphs 09/17/2020 Present   Final     Platelet Estimate 09/17/2020 Automated count confirmed.  Platelet morphology is normal.   Final     Diff Method 09/17/2020 Manual Differential   Final             Assessment & Plan       ICD-10-CM    1. Basal cell carcinoma (BCC) of back  C44.519 Surgical pathology exam     HANDLING CHARGE TO REF LAB     EXC MALIG SKIN LESION TRUNK/ARM/LEG 1.1-2.0 CM     REPAIR INTERMED, WOUND TRUNK/ARM/LEG 2.6-7.5 CM   2. Pain of toe of right foot  M79.674    3. CLL (chronic lymphocytic leukemia) (H)  C91.10    4. Thrombocytopenia (H)  D69.6    5. Need for prophylactic vaccination and inoculation against influenza  Z23 FLUZONE HIGH DOSE 65+  [36643]     ADMIN INFLUENZA (For MEDICARE Patients ONLY) []     Routine wound care for the excision site  Return in about 2 weeks for suture  removal  Discussed appropriate shoe wear for his right foot hammertoes  (shoes with a comfortable wide toe box in particular)  Discussed his elevated white blood cell count, which is higher than previously, and the desire to have him see his hematologist/oncologist Dr. Andres to follow-up on this, which he is planning to do  We will give him a flu shot today        Return in about 2 weeks (around 11/2/2020) for suture removal.    Navdeep Pabon MD  [facility]

## 2020-10-21 LAB — COPATH REPORT: NORMAL

## 2020-10-29 ENCOUNTER — VIRTUAL VISIT (OUTPATIENT)
Dept: URGENT CARE | Facility: CLINIC | Age: 85
End: 2020-10-29
Payer: COMMERCIAL

## 2020-10-29 ENCOUNTER — TRANSFERRED RECORDS (OUTPATIENT)
Dept: HEALTH INFORMATION MANAGEMENT | Facility: CLINIC | Age: 85
End: 2020-10-29

## 2020-10-29 ENCOUNTER — NURSE TRIAGE (OUTPATIENT)
Dept: NURSING | Facility: CLINIC | Age: 85
End: 2020-10-29

## 2020-10-29 ENCOUNTER — OFFICE VISIT (OUTPATIENT)
Dept: URGENT CARE | Facility: URGENT CARE | Age: 85
End: 2020-10-29
Payer: COMMERCIAL

## 2020-10-29 VITALS
SYSTOLIC BLOOD PRESSURE: 128 MMHG | HEART RATE: 92 BPM | OXYGEN SATURATION: 94 % | TEMPERATURE: 102.1 F | DIASTOLIC BLOOD PRESSURE: 68 MMHG

## 2020-10-29 DIAGNOSIS — C91.10 CLL (CHRONIC LYMPHOCYTIC LEUKEMIA) (H): Primary | ICD-10-CM

## 2020-10-29 DIAGNOSIS — R09.02 HYPOXIA: ICD-10-CM

## 2020-10-29 DIAGNOSIS — R50.9 FEBRILE ILLNESS: ICD-10-CM

## 2020-10-29 LAB
ANISOCYTOSIS BLD QL SMEAR: SLIGHT
DIFFERENTIAL METHOD BLD: ABNORMAL
ERYTHROCYTE [DISTWIDTH] IN BLOOD BY AUTOMATED COUNT: 13.2 % (ref 10–15)
HCT VFR BLD AUTO: 41.2 % (ref 40–53)
HGB BLD-MCNC: 13 G/DL (ref 13.3–17.7)
LYMPHOCYTES # BLD AUTO: 96.2 10E9/L (ref 0.8–5.3)
LYMPHOCYTES NFR BLD AUTO: 90 %
MACROCYTES BLD QL SMEAR: PRESENT
MCH RBC QN AUTO: 32.3 PG (ref 26.5–33)
MCHC RBC AUTO-ENTMCNC: 31.6 G/DL (ref 31.5–36.5)
MCV RBC AUTO: 103 FL (ref 78–100)
MICROCYTES BLD QL SMEAR: PRESENT
NEUTROPHILS # BLD AUTO: 10.7 10E9/L (ref 1.6–8.3)
NEUTROPHILS NFR BLD AUTO: 10 %
PLATELET # BLD AUTO: 108 10E9/L (ref 150–450)
PLATELET # BLD EST: ABNORMAL 10*3/UL
RBC # BLD AUTO: 4.02 10E12/L (ref 4.4–5.9)
SMUDGE CELLS BLD QL SMEAR: PRESENT
VARIANT LYMPHS BLD QL SMEAR: PRESENT
WBC # BLD AUTO: 106.9 10E9/L (ref 4–11)

## 2020-10-29 PROCEDURE — 85025 COMPLETE CBC W/AUTO DIFF WBC: CPT | Performed by: FAMILY MEDICINE

## 2020-10-29 PROCEDURE — 99207 PR NO CHARGE LOS: CPT | Performed by: FAMILY MEDICINE

## 2020-10-29 PROCEDURE — 36415 COLL VENOUS BLD VENIPUNCTURE: CPT | Performed by: FAMILY MEDICINE

## 2020-10-29 PROCEDURE — 99215 OFFICE O/P EST HI 40 MIN: CPT | Mod: 24 | Performed by: FAMILY MEDICINE

## 2020-10-29 NOTE — PATIENT INSTRUCTIONS
Fevers, WBC initial reading 106.9 known CLL concern for hyperleukocytosis and sepsis rule out COVID 19  Oxygen 94% in Room Air  Recommend ASAP ER evaluation    Diagnostic Test Results:  Results for orders placed or performed in visit on 10/29/20 (from the past 24 hour(s))   CBC with platelets differential   Result Value Ref Range    .9 (HH) 4.0 - 11.0 10e9/L    RBC Count 4.02 (L) 4.4 - 5.9 10e12/L    Hemoglobin 13.0 (L) 13.3 - 17.7 g/dL    Hematocrit 41.2 40.0 - 53.0 %     (H) 78 - 100 fl    MCH 32.3 26.5 - 33.0 pg    MCHC 31.6 31.5 - 36.5 g/dL    RDW 13.2 10.0 - 15.0 %    Platelet Count 108 (L) 150 - 450 10e9/L    Diff Method PENDING

## 2020-10-29 NOTE — PROGRESS NOTES
"The patient has been notified of following:     \"This telephone or video visit will be conducted via a call between you and your physician/provider. We have found that certain health care needs can be provided without the need for a physical exam.  This service lets us provide the care you need with a short phone conversation.  If a prescription is necessary we can send it directly to your pharmacy.  If lab work is needed we can place an order for that and you can then stop by our lab to have the test done at a later time.    Telephone or video visits are billed at different rates depending on your insurance coverage. During this emergency period, for some insurers they may be billed the same as an in-person visit.  Please reach out to your insurance provider with any questions.    Patient has given verbal consent for Telephone or video visit?  Yes  Subjective   HPI  Concern for covid    Started this morning at 6 am   Chills aching sore throat headache and fatigue  No chest pain or shortness of breath   No abdominal pain   No nausea vomiting or diarrhea      Patient Active Problem List   Diagnosis     Erectile dysfunction     Hyperlipidemia LDL goal <100     Advanced directives, counseling/discussion     Chronic rhinitis     Benign non-nodular prostatic hyperplasia with lower urinary tract symptoms     Thrombocytopenia (H)     Actinic keratoses     Gastroesophageal reflux disease, esophagitis presence not specified     History of melanoma     Urinary retention with incomplete bladder emptying     Impaired fasting glucose     CLL (chronic lymphocytic leukemia) (H)     CKD (chronic kidney disease) stage 3, GFR 30-59 ml/min     Basal cell carcinoma (BCC) of back     Past Surgical History:   Procedure Laterality Date     APPENDECTOMY       ARTHROSCOPY KNEE      right     CATARACT IOL, RT/LT  2/11    right     CATARACT IOL, RT/LT  2/13    left     HERNIA REPAIR  1989    bilat inguinal     TONSILLECTOMY       TURP  2008 " and 5/18/17       Social History     Tobacco Use     Smoking status: Former Smoker     Smokeless tobacco: Never Used     Tobacco comment: high school   Substance Use Topics     Alcohol use: No     No family history on file.        Reviewed and updated as needed this visit by Provider   Allergies  Meds              Review of Systems   Constitutional, HEENT, cardiovascular, pulmonary, GI, , musculoskeletal, neuro, skin, endocrine and psych systems are negative, except as otherwise noted.       Objective   Reported vitals:  There were no vitals taken for this visit.   healthy, alert and no distress  PSYCH: Alert and oriented times 3; coherent speech, normal   rate and volume, able to articulate logical thoughts, able   to abstract reason, no tangential thoughts, no hallucinations   or delusions  His affect is normal  RESP: No cough, no audible wheezing, able to talk in full sentences  Additional exam:  none  Remainder of exam unable to be completed due to telephone visits    Diagnostic Test Results:  Labs reviewed in Epic  none         Assessment/Plan:      ICD-10-CM    1. CLL (chronic lymphocytic leukemia) (H)  C91.10    2. Febrile illness  R50.9      Most recent cbc 97 wbc very critically elevated.  Concern for covid vs acute crisis   Recommend in person evalaution ER or UC. Patient plans to go to UC first but aware that depending on results may need to be triaged to ER       call duration:  11 minutes  Video: no    Beba Beltran MD

## 2020-10-29 NOTE — PROGRESS NOTES
Chief complaint: concern for covid    Started this morning at 6 am   Chills aching sore throat headache and fatigue  No chest pain or shortness of breath   No abdominal pain   No nausea vomiting or diarrhea  Patient felt feverish    No Known Allergies    Past Medical History:   Diagnosis Date     Actinic keratoses 7/19/2018     Basal cell carcinoma (BCC) of back 10/19/2020     BPH (benign prostatic hypertrophy)      CKD (chronic kidney disease) stage 3, GFR 30-59 ml/min 8/20/2019     CLL (chronic lymphocytic leukemia) (H) 07/2018    followed by Dr. Michela Andres from [place] [facility]     Erectile dysfunction      GERD (gastroesophageal reflux disease)      Hyperlipidemia LDL goal <100      Impaired fasting glucose 07/2018     Melanoma (H) 2/03    superficial spreading            APPLE CIDER VINEGAR PO, Take by mouth daily        Ascorbic Acid (VITAMIN C) 500 MG CAPS, Take by mouth daily       Cholecalciferol (D3 VITAMIN PO), Take 25 mcg by mouth daily       FISH OIL, daily        Garlic 1000 MG CAPS, Take by mouth daily       Multiple Vitamin (ONE-A-DAY MENS PO), Take by mouth daily        VITAMIN E PO, Take 180 mg by mouth daily       zinc 50 MG TABS, Take by mouth daily    No current facility-administered medications on file prior to visit.       Social History     Tobacco Use     Smoking status: Former Smoker     Smokeless tobacco: Never Used     Tobacco comment: high school   Substance Use Topics     Alcohol use: No     Drug use: No       ROS:  review of systems negative except for noted above.       OBJECTIVE:  /68   Pulse 92   Temp 102.1  F (38.9  C) (Tympanic)   SpO2 94%    General:   awake, alert, and cooperative.  NAD.   Head: Normocephalic, atraumatic.  Eyes: Conjunctiva clear  Mouth: tonsils not enlarged no trismus   Heart: Regular rate and rhythm. No murmur.  Lungs: Chest is clear; no wheezes or rales.   Abdomen: soft non-tender.   Neuro: Alert and oriented - normal speech.  MS: Using extremities  freely  PSYCH:  Normal affect, normal speech  SKIN: no obvious rashes    Diagnostic Test Results:  Results for orders placed or performed in visit on 10/29/20 (from the past 24 hour(s))   CBC with platelets differential   Result Value Ref Range    .9 (HH) 4.0 - 11.0 10e9/L    RBC Count 4.02 (L) 4.4 - 5.9 10e12/L    Hemoglobin 13.0 (L) 13.3 - 17.7 g/dL    Hematocrit 41.2 40.0 - 53.0 %     (H) 78 - 100 fl    MCH 32.3 26.5 - 33.0 pg    MCHC 31.6 31.5 - 36.5 g/dL    RDW 13.2 10.0 - 15.0 %    Platelet Count 108 (L) 150 - 450 10e9/L    Diff Method PENDING          ASSESSMENT:    ICD-10-CM    1. CLL (chronic lymphocytic leukemia) (H)  C91.10 CBC with platelets differential   2. Febrile illness  R50.9    3. Hypoxia  R09.02        PLAN:   Fevers, WBC initial reading 106.9 known CLL concern for hyperleukocytosis and sepsis rule out COVID 19  Oxygen 94% in Room Air  Recommend ASAP ER evaluation  Patient declined ambulance. Risks of transport discussed.   Report given to [facility]. AVS printed and given to patient   Advised about symptoms which might herald more serious problems.        Beba Beltran MD

## 2020-10-29 NOTE — TELEPHONE ENCOUNTER
"Pt & wife calling  Sx started yesterday  +HA  +Fatigue  \"body aches  +Chills  Pt is \"sweaty\"  + weakness  No chest pain or tightness  Feels \"woobely\"   +cough  +sore throat   No GI symptoms  Per protocol pt to be evaluated today   Reviewed care advice & when to call back   Pt agrees with plan  [facility] virtual appointment set for this morning  Rosa Parnell RN  [facility]     Reason for Disposition    [1] COVID-19 infection suspected by caller or triager AND [2] mild symptoms (cough, fever, or others) AND [3] no complications or SOB    Additional Information    Negative: SEVERE difficulty breathing (e.g., struggling for each breath, speaks in single words)    Negative: Difficult to awaken or acting confused (e.g., disoriented, slurred speech)    Negative: Bluish (or gray) lips or face now    Negative: Shock suspected (e.g., cold/pale/clammy skin, too weak to stand, low BP, rapid pulse)    Negative: Sounds like a life-threatening emergency to the triager    Negative: [1] COVID-19 exposure AND [2] no symptoms    Negative: COVID-19 and Breastfeeding, questions about    Negative: [1] Adult with possible COVID-19 symptoms AND [2] triager concerned about severity of symptoms or other causes    Negative: SEVERE or constant chest pain or pressure (Exception: mild central chest pain, present only when coughing)    Negative: MODERATE difficulty breathing (e.g., speaks in phrases, SOB even at rest, pulse 100-120)    Negative: Patient sounds very sick or weak to the triager    Negative: MILD difficulty breathing (e.g., minimal/no SOB at rest, SOB with walking, pulse <100)    Negative: Chest pain or pressure    Negative: Fever > 103 F (39.4 C)    Negative: [1] Fever > 101 F (38.3 C) AND [2] age > 60    Negative: [1] Fever > 100.0 F (37.8 C) AND [2] bedridden (e.g., nursing home patient, CVA, chronic illness, recovering from surgery)    Negative: HIGH RISK patient (e.g., age > 64 years, diabetes, heart or lung " disease, weak immune system) (Exception: Has already been evaluated by healthcare provider and has no new or worsening symptoms)    Negative: Fever present > 3 days (72 hours)    Negative: [1] Fever returns after gone for over 24 hours AND [2] symptoms worse or not improved    Negative: [1] Continuous (nonstop) coughing interferes with work or school AND [2] no improvement using cough treatment per protocol    [facility] Specific Disposition  - [facility] Specific Patient Instructions  COVID 19 Nurse Triage Plan/Patient Instructions    Please be aware that novel coronavirus (COVID-19) may be circulating in the community. If you develop symptoms such as fever, cough, or SOB or if you have concerns about the presence of another infection including coronavirus (COVID-19), please contact your health care provider or visit [email or web address].       Virtual Visit with provider recommended. Reference Visit Selection Guide.    Thank you for taking steps to prevent the spread of this virus.  Limit your contact with others.  Wear a simple mask to cover your cough.  Wash your hands well and often.    Resources  [facility]: About COVID-19: [email or web address]  CDC: What to Do If You're Sick: www.cdc.gov/coronavirus/2019-ncov/about/steps-when-sick.html  CDC: Ending Home Isolation: www.cdc.gov/coronavirus/2019-ncov/hcp/disposition-in-home-patients.html   CDC: Caring for Someone: www.cdc.gov/coronavirus/2019-ncov/if-you-are-sick/care-for-someone.html   [facility]: Interim Guidance for Hospital Discharge to Home: www.health.[facility].[place]/diseases/coronavirus/hcp/hospdischarge.pdf  [facility] clinical trials (COVID-19 research studies): clinicalaffairs.[facility]/um-clinical-trials   Below are the COVID-19 hotlines at the [place] Department of Health ([facility]). Interpreters are available.   For health questions: Call 142-399-7673 or 1-580.748.2270 (7 a.m. to 7 p.m.)  For questions about schools and  childcare: Call 984-388-1722 or 1-322.225.1503 (7 a.m. to 7 p.m.)    Protocols used: CORONAVIRUS (COVID-19) DIAGNOSED OR [id number] 8.4.20

## 2020-10-31 ENCOUNTER — NURSE TRIAGE (OUTPATIENT)
Dept: NURSING | Facility: CLINIC | Age: 85
End: 2020-10-31

## 2020-10-31 NOTE — TELEPHONE ENCOUNTER
"Clinic Action Needed: Yes, follow up  [facility] Triage Call  Presenting Problem:    Vinnie was at [facility] on 10/29. Diagnosed with COVID and is advised to self-isolate. Symptoms started on 10/29.    Concerns:  1) Stitches due for removal on Monday 11/2. Given diagnosis of  COVID, I have advised to have care team follow up on Monday to reschedule suture removal    2) CLL - .9    He reports that his fever and sore throat are gone. Has a \"little confusion,\" but states that it is his baseline.     Routed to: CP TEAM 1 [60174]    Please be sure to close this encounter once this patient's issue/question has been addressed.    Kerry Yoder RN/[facility]      Reason for Disposition    [1] Caller requesting NON-URGENT health information AND [2] PCP's office is the best resource    Protocols used: INFORMATION ONLY CALL-A-AH      "

## 2020-11-02 ENCOUNTER — PATIENT OUTREACH (OUTPATIENT)
Dept: NURSING | Facility: CLINIC | Age: 85
End: 2020-11-02
Payer: COMMERCIAL

## 2020-11-02 DIAGNOSIS — U07.1 2019 NOVEL CORONAVIRUS DISEASE (COVID-19): Primary | ICD-10-CM

## 2020-11-02 RX ORDER — DEXAMETHASONE 6 MG/1
6 TABLET ORAL
COMMUNITY
Start: 2020-10-31

## 2020-11-02 SDOH — ECONOMIC STABILITY: TRANSPORTATION INSECURITY
IN THE PAST 12 MONTHS, HAS THE LACK OF TRANSPORTATION KEPT YOU FROM MEDICAL APPOINTMENTS OR FROM GETTING MEDICATIONS?: NO

## 2020-11-02 SDOH — ECONOMIC STABILITY: FOOD INSECURITY: WITHIN THE PAST 12 MONTHS, YOU WORRIED THAT YOUR FOOD WOULD RUN OUT BEFORE YOU GOT MONEY TO BUY MORE.: NEVER TRUE

## 2020-11-02 SDOH — ECONOMIC STABILITY: TRANSPORTATION INSECURITY
IN THE PAST 12 MONTHS, HAS LACK OF TRANSPORTATION KEPT YOU FROM MEETINGS, WORK, OR FROM GETTING THINGS NEEDED FOR DAILY LIVING?: NO

## 2020-11-02 SDOH — ECONOMIC STABILITY: FOOD INSECURITY: WITHIN THE PAST 12 MONTHS, THE FOOD YOU BOUGHT JUST DIDN'T LAST AND YOU DIDN'T HAVE MONEY TO GET MORE.: NEVER TRUE

## 2020-11-02 SDOH — ECONOMIC STABILITY: INCOME INSECURITY: HOW HARD IS IT FOR YOU TO PAY FOR THE VERY BASICS LIKE FOOD, HOUSING, MEDICAL CARE, AND HEATING?: NOT HARD AT ALL

## 2020-11-02 ASSESSMENT — ACTIVITIES OF DAILY LIVING (ADL): DEPENDENT_IADLS:: INDEPENDENT

## 2020-11-02 NOTE — TELEPHONE ENCOUNTER
He will follow-up with his oncologist regarding his CLL, but he does need to get his sutures removed.  Perhaps he could have a nurse appointment for that and they can wear PPE (?).  It should only take about 5 minutes, but should ideally be done sometime this week.  I am out of office the rest of this week.

## 2020-11-02 NOTE — LETTER
[place] CARE COORDINATION  [address]  [place] [address]    November 2, 2020    Vinnie Moreausuzan  [address]   [place] [address]      Dear Vinnie,    I am a clinic care coordinator who works with Navdeep Pabon MD at [facility]. I wanted to thank you for spending the time to talk with me.  Below is a description of clinic care coordination and how I can further assist you.      The clinic care coordination team is made up of a registered nurse,  and community health worker who understand the health care system. The goal of clinic care coordination is to help you manage your health and improve access to the health care system in the most efficient manner. The team can assist you in meeting your health care goals by providing education, coordinating services, strengthening the communication among your providers and supporting you with any resource needs.    Please feel free to contact me at [phone] with any questions or concerns. We are focused on providing you with the highest-quality healthcare experience possible and that all starts with you.     Sincerely,     Shon Manzo MSN, RN, PHN, [facility]   Primary Care Clinical RN Care Coordinator  [facility]  11/2/2020   9:43 AM  [email or web address]  Office: [phone]      Enclosed: I have enclosed a copy of the Complex Care Plan. This has helpful information and goals that we have talked about. Please keep this in an easy to access place to use as needed.

## 2020-11-02 NOTE — PROGRESS NOTES
Clinic Care Coordination Contact    Clinic Care Coordination Contact  OUTREACH    Referral Information:  Referral Source: Non-[facility]    Primary Diagnosis: Respiratory Disorders - other    Chief Complaint   Patient presents with     Clinic Care Coordination - [facility]     Shon Jovany RN nurse care coordinator phone visit for initial assessment        Universal Utilization: The patient uses the [facility] system and the [facility] system.  Clinic Utilization  Difficulty keeping appointments:: No  Compliance Concerns: No  No-Show Concerns: No  No PCP office visit in Past Year: No  Utilization    Last refreshed: 11/2/2020  9:38 AM: Hospital Admissions 0           Last refreshed: 11/2/2020  9:38 AM: ED Visits 0           Last refreshed: 11/2/2020  9:38 AM: No Show Count (past year) 0              Current as of: 11/2/2020  9:38 AM              Clinical Concerns:  Current Medical Concerns:  The patient was recently hospitalized for COVID 19 and chronic lymphocytic leukemia.  The patient has returned home and is very concerned with how weak he feels at this time.  The RN CC nurse care coordinator encouraged the patient to use a walker at this time to prevent falls and transfer to a cane as his strength returns.  There is also no way to tell how much of the weakness is due to the CLL.  The RN CC reviewed the signs and symptoms that would indicated a need to return to the ED.  The patient stated understanding.    Medication reconciliation was completed with the patient and marked as reviewed.  Health maintenance was reviewed and questions were answered with the patient.  The social determinants of health were reviewed and they were marked as reviewed.    Medication reconciliation status: Medications reviewed and reconciled.  Continue medications without change.     Patient Active Problem List   Diagnosis     Erectile dysfunction     Hyperlipidemia LDL goal <100     Advanced directives,  counseling/discussion     Chronic rhinitis     Benign non-nodular prostatic hyperplasia with lower urinary tract symptoms     Thrombocytopenia (H)     Actinic keratoses     Gastroesophageal reflux disease, esophagitis presence not specified     History of melanoma     Urinary retention with incomplete bladder emptying     Impaired fasting glucose     CLL (chronic lymphocytic leukemia) (H)     CKD (chronic kidney disease) stage 3, GFR 30-59 ml/min     Basal cell carcinoma (BCC) of back     Current Behavioral Concerns: none currently noted    Education Provided to patient: reviewed with the patient signs and symptoms of needing to return to the ED for treatment.   Pain  Pain (GOAL):: No  Health Maintenance Reviewed: Due/Overdue   Health Maintenance Due   Topic Date Due     ZOSTER IMMUNIZATION (1 of 2) 10/12/1983       Clinical Pathway: None    Medication Management:  Patient is knowledgeable on medications and is adherent.  No financial concerns reported at this time.  Medication reconciliation was completed with the patient and there are no questions or concerns.     Functional Status:  Dependent ADLs:: Ambulation-walker  Dependent IADLs:: Independent  Bed or wheelchair confined:: No  Mobility Status: Independent w/Device  Fallen 2 or more times in the past year?: No  Any fall with injury in the past year?: No    Living Situation:  Current living arrangement:: I live in a private home with spouse  Type of residence:: [facility]    Lifestyle & Psychosocial Needs:     Social Needs     Financial resource strain: Not hard at all     Food insecurity     Worry: Never true     Inability: Never true     Transportation needs     Medical: No     Non-medical: No     Diet:: Regular  Inadequate nutrition (GOAL):: No  Tube Feeding: No  Inadequate activity/exercise (GOAL):: No  Significant changes in sleep pattern (GOAL): No  Transportation means:: Regular car     Synagogue or spiritual beliefs that impact treatment:: No  Mental  health DX:: No  Mental health management concern (GOAL):: No  Informal Support system:: Spouse   Socioeconomic History     Marital status:      Spouse name: Not on file     Number of children: Not on file     Years of education: Not on file     Highest education level: Not on file     Tobacco Use     Smoking status: Former Smoker     Smokeless tobacco: Never Used     Tobacco comment: high school   Substance and Sexual Activity     Alcohol use: No     Drug use: No     Sexual activity: Yes     Partners: Female               Resources and Interventions:  Current Resources:      Community Resources: None  Supplies used at home:: None  Equipment Currently Used at Home: walker, standard, cane, straight  Employment Status: retired)   )    Advance Care Plan/Directive  Advanced Care Plans/Directives on file:: No  Advanced Care Plan/Directive Status: Considering Options    Referrals Placed: None     Goals:   Goals        General    #1  Monitoring (pt-stated)     Notes - Note edited  11/2/2020  9:40 AM by Shon Jones RN    Goal Statement: I will use my walker or cane as an assistive device for when I get up and move around to prevent falling.  Date Goal set: 11/2/2020  Barriers: Recovering from Covid  Strengths: Engaged in care coordination.  Date to Achieve By: 5/2/2021   Patient expressed understanding of goal: Yes  Action steps to achieve this goal:  1. I will use my walker when I get up to move around and tell I am strong enough to use my cane.  2. I will walk frequently to prevent blood clots and to increase my strength and stamina.  3. I will use extra care when I go into the bathroom where it is easy to fall.              Patient/Caregiver understanding: The patient has a very good understanding of the disease process.  He may have benefited with going to a TCU.    Outreach Frequency: weekly      Plan: 1.  The patient will use a walker for stability until his strength and stamina returned.  2.  The patient  will take all medications as prescribed by the providers.  3.  The patient will monitor the signs and symptoms that indicate he should return to the ED.          Shon Manzo MSN, RN, PHN, CCM   Primary Care Clinical RN Care Coordinator  [facility]  11/2/2020   11:57 AM  [email or web address]  Office: [phone]

## 2020-11-02 NOTE — TELEPHONE ENCOUNTER
I see he had sutures placed for BCC on his back 10/19/20 by Dr. Pabon.    9 sutures.       I checked with clinic PCS who advises we could do suture removal but need full PPE if provider feels is essential.    I called and spoke to patient, he says the sutures are out, had someone remove them at home.  I inquired if they are sure they got the whole suture out and not just the knots, he is confident this was done correctly.    Advised him to call [facility] to see if needs virtual visit as well.    Patient verbalized understanding of and agreement with plan.    Routed to Dr. Pabon as FYI.    Rosa Murphy RN  [facility]

## 2020-11-02 NOTE — LETTER
[facility]  Complex Care Plan  About Me:    Patient Name:  Vinnie Duran    YOB: 1933  Age:         87 year old   [place] MRN:    [id number] Telephone Information:  Home Phone [phone]   Mobile [phone]       Address:  [address] [place] [address] Email address:  No e-mail address on record      Emergency Contact(s)    Name Relationship Lgl Grd Work Phone Home Phone Mobile Phone   1. SHILOH DURAN Spouse   [phone]    2. CECILIO ALLEN Other   [phone]            Primary language:  English     needed? No   [facility] [employer]:  [phone] op. 1  Other communication barriers: None  Preferred Method of Communication:  Mail  Current living arrangement: I live in a private home with spouse  Mobility Status/ Medical Equipment: Independent w/Device    Health Maintenance  Health Maintenance Reviewed: Due/Overdue   Health Maintenance Due   Topic Date Due     ZOSTER IMMUNIZATION (1 of 2) 10/12/1983         My Access Plan  Medical Emergency 911   Primary Clinic Line [facility] - [phone]   24 Hour Appointment Line [phone] or  [phone]) (toll-free)   24 Hour Nurse Line [phone] (toll-free)   Preferred Urgent Care     [facility]  [phone]   Preferred Pharmacy [facility] [address] [place] [address] [address] [facility]     Behavioral Health Crisis Line The National Suicide Prevention Lifeline at 1-400.583.7438 or 911             My Care Team Members  Patient Care Team       Relationship Specialty Notifications Start End    Navdeep Pabon MD PCP - General   3/8/09     Phone: [phone] Fax: [phone]         [address] [facility] [address]    Navdeep Pabon MD Assigned PCP   3/31/14     Phone: [phone] Fax: [phone]         [address] [facility] [address]    Elvis Zavaleta MD Assigned Surgical Provider    10/23/20     Phone: [phone] Fax: [phone]         [address] [facility] [place] [address]    Shon Jones, RN Lead Care Coordinator Primary Care - CC Admissions 11/2/20     Phone: [phone] Fax: [phone]                My Care Plans  Self Management and Treatment Plan  Goals and (Comments)  Goals        General    #1  Monitoring (pt-stated)     Notes - Note edited  11/2/2020  9:40 AM by Shon Jones, RN    Goal Statement: I will use my walker or cane as an assistive device for when I get up and move around to prevent falling.  Date Goal set: 11/2/2020  Barriers: Recovering from Covid  Strengths: Engaged in care coordination.  Date to Achieve By: 5/2/2021   Patient expressed understanding of goal: Yes  Action steps to achieve this goal:  1. I will use my walker when I get up to move around and tell I am strong enough to use my cane.  2. I will walk frequently to prevent blood clots and to increase my strength and stamina.  3. I will use extra care when I go into the bathroom where it is easy to fall.               Action Plans on File:                       Advance Care Plans/Directives Type:        My Medical and Care Information  Problem List   Patient Active Problem List   Diagnosis     Erectile dysfunction     Hyperlipidemia LDL goal <100     Advanced directives, counseling/discussion     Chronic rhinitis     Benign non-nodular prostatic hyperplasia with lower urinary tract symptoms     Thrombocytopenia (H)     Actinic keratoses     Gastroesophageal reflux disease, esophagitis presence not specified     History of melanoma     Urinary retention with incomplete bladder emptying     Impaired fasting glucose     CLL (chronic lymphocytic leukemia) (H)     CKD (chronic kidney disease) stage 3, GFR 30-59 ml/min     Basal cell carcinoma (BCC) of back      Current Medications and Allergies:  See printed Medication Report.    Care Coordination Start Date: 11/2/2020   Frequency of Care Coordination: weekly    Form Last Updated: 11/02/2020

## 2020-11-03 ENCOUNTER — TRANSFERRED RECORDS (OUTPATIENT)
Dept: HEALTH INFORMATION MANAGEMENT | Facility: CLINIC | Age: 85
End: 2020-11-03

## 2020-11-03 LAB
CREAT SERPL-MCNC: 1.7 MG/DL (ref 0.57–1.11)
GFR SERPL CREATININE-BSD FRML MDRD: 38 ML/MIN/1.73M2
GLUCOSE SERPL-MCNC: 154 MG/DL (ref 65–100)
POTASSIUM SERPL-SCNC: 4.7 MMOL/L (ref 3.5–5)

## 2020-11-06 ENCOUNTER — PATIENT OUTREACH (OUTPATIENT)
Dept: CARE COORDINATION | Facility: CLINIC | Age: 85
End: 2020-11-06

## 2020-11-06 NOTE — PROGRESS NOTES
Clinic Care Coordination Contact  Care Team Conversations    RN CC was notified of the patient becoming .  The chart was marked accordingly and the encounter closed.      Shon Manzo MSN, RN, PHN, CCM   Primary Care Clinical RN Care Coordinator  [facility]  2020   1:37 PM  [email or web address]  Office: [phone]

## 2020-12-15 NOTE — MR AVS SNAPSHOT
"              After Visit Summary   8/20/2018    Vinnie Duran    MRN: [id number]           Patient Information     Date Of Birth          10/12/1933        Visit Information        Provider Department      8/20/2018 8:40 AM Navdeep Pabon MD [facility]        Today's Diagnoses     Left-sided low back pain with left-sided sciatica, unspecified chronicity    -  1    Elevated serum creatinine        Lymphocytosis           Follow-ups after your visit        Follow-up notes from your care team     Return if symptoms worsen or fail to improve.      Who to contact     If you have questions or need follow up information about today's clinic visit or your schedule please contact [facility] directly at [phone].  Normal or non-critical lab and imaging results will be communicated to you by MyChart, letter or phone within 4 business days after the clinic has received the results. If you do not hear from us within 7 days, please contact the clinic through MyChart or phone. If you have a critical or abnormal lab result, we will notify you by phone as soon as possible.  Submit refill requests through [facility] or call your pharmacy and they will forward the refill request to us. Please allow 3 business days for your refill to be completed.          Additional Information About Your Visit        MyChart Information     [facility] lets you send messages to your doctor, view your test results, renew your prescriptions, schedule appointments and more. To sign up, go to [email or web address] . Click on \"Log in\" on the left side of the screen, which will take you to the Welcome page. Then click on \"Sign up Now\" on the right side of the page.     You will be asked to enter the access code listed below, as well as some personal information. Please follow the directions to create your username and password.     Your access code is: [email or web address]  Expires: 11/18/2018  8:51 AM     Your " asthma access code will  in 90 days. If you need help or a new code, please call your [place] clinic or [phone].        Care EveryWhere ID     This is your Care EveryWhere ID. This could be used by other organizations to access your [facility] medical records  [id number]        Your Vitals Were     Pulse Temperature Pulse Oximetry BMI (Body Mass Index)          64 97.9  F (36.6  C) (Oral) 94% 28.19 kg/m2         Blood Pressure from Last 3 Encounters:   18 140/80   18 112/68   18 137/84    Weight from Last 3 Encounters:   18 186 lb (84.4 kg)   18 186 lb (84.4 kg)   18 191 lb 9.6 oz (86.9 kg)              We Performed the Following     Basic metabolic panel     CBC with platelets and differential        Primary Care Provider Office Phone # Fax #    Navdeep Pabon [phone]       [address] [facility] [address]        Equal Access to Services     SAGE INFANTE : Hadii aad ku hadasho Soomaali, waaxda luqadaha, qaybta kaalmada adeegyada, waxay idiin haywhitneyn zulma aiken . So [facility] [phone].    ATENCIÓN: Si habla español, tiene a ferro disposición servicios gratuitos de asistencia lingüística. Llame al [phone].    We comply with applicable federal civil rights laws and [place] laws. We do not discriminate on the basis of race, color, national origin, age, disability, sex, sexual orientation, or gender identity.            Thank you!     Thank you for choosing [facility]  for your care. Our goal is always to provide you with excellent care. Hearing back from our patients is one way we can continue to improve our services. Please take a few minutes to complete the written survey that you may receive in the mail after your visit with us. Thank you!             Your Updated Medication List - Protect others around you: Learn how to safely use, store and throw away your medicines at www.disposemymeds.org.           This list is accurate as of 8/20/18  8:51 AM.  Always use your most recent med list.                   Brand Name Dispense Instructions for use Diagnosis    albuterol 108 (90 Base) MCG/ACT inhaler    PROAIR HFA/PROVENTIL HFA/VENTOLIN HFA    1 Inhaler    Inhale 1-2 puffs into the lungs every 6 hours as needed for shortness of breath / dyspnea or wheezing    Acute bronchospasm       APPLE CIDER VINEGAR PO           FISH OIL           ipratropium 0.03 % spray    ATROVENT    1 Box    Spray 2 sprays into both nostrils every 12 hours    Chronic rhinitis       ONE-A-DAY MENS PO           pantoprazole 40 MG EC tablet    PROTONIX    90 tablet    Take 1 tablet (40 mg) by mouth daily as needed for gastroesophageal reflux disease.  Take 30-60 minutes before a meal.    Gastroesophageal reflux disease, esophagitis presence not specified       tadalafil 20 MG tablet    CIALIS    6 tablet    He can 1/2-1 tablet up to once a day as needed before sex.  Never use with nitroglycerin, terazosin or doxazosin.    Erectile dysfunction due to arterial insufficiency       TURMERIC PO

## 2022-02-17 PROBLEM — K21.9 GASTROESOPHAGEAL REFLUX DISEASE: Status: ACTIVE | Noted: 2018-07-19
